# Patient Record
Sex: MALE | Race: BLACK OR AFRICAN AMERICAN | NOT HISPANIC OR LATINO | Employment: FULL TIME | URBAN - METROPOLITAN AREA
[De-identification: names, ages, dates, MRNs, and addresses within clinical notes are randomized per-mention and may not be internally consistent; named-entity substitution may affect disease eponyms.]

---

## 2017-09-20 DIAGNOSIS — M79.609 PAIN IN LIMB: Primary | ICD-10-CM

## 2017-09-22 ENCOUNTER — LAB VISIT (OUTPATIENT)
Dept: LAB | Facility: HOSPITAL | Age: 52
End: 2017-09-22
Attending: NURSE PRACTITIONER
Payer: MEDICAID

## 2017-09-22 DIAGNOSIS — Z00.01 ENCOUNTER FOR GENERAL ADULT MEDICAL EXAMINATION WITH ABNORMAL FINDINGS: ICD-10-CM

## 2017-09-22 DIAGNOSIS — Z12.5 SCREENING FOR PROSTATE CANCER: Primary | ICD-10-CM

## 2017-09-22 DIAGNOSIS — Z13.220 SCREENING FOR LIPID DISORDERS: ICD-10-CM

## 2017-09-22 LAB
ALBUMIN SERPL BCP-MCNC: 4.7 G/DL
ALP SERPL-CCNC: 55 U/L
ALT SERPL W/O P-5'-P-CCNC: 40 U/L
ANION GAP SERPL CALC-SCNC: 12 MMOL/L
AST SERPL-CCNC: 34 U/L
BASOPHILS # BLD AUTO: 0.03 K/UL
BASOPHILS NFR BLD: 0.5 %
BILIRUB SERPL-MCNC: 0.7 MG/DL
BUN SERPL-MCNC: 13 MG/DL
CALCIUM SERPL-MCNC: 9.7 MG/DL
CHLORIDE SERPL-SCNC: 105 MMOL/L
CO2 SERPL-SCNC: 28 MMOL/L
CREAT SERPL-MCNC: 0.93 MG/DL
DIFFERENTIAL METHOD: NORMAL
EOSINOPHIL # BLD AUTO: 0.1 K/UL
EOSINOPHIL NFR BLD: 2.3 %
ERYTHROCYTE [DISTWIDTH] IN BLOOD BY AUTOMATED COUNT: 13.1 %
EST. GFR  (AFRICAN AMERICAN): >60 ML/MIN/1.73 M^2
EST. GFR  (NON AFRICAN AMERICAN): >60 ML/MIN/1.73 M^2
GLUCOSE SERPL-MCNC: 77 MG/DL
HCT VFR BLD AUTO: 44.2 %
HGB BLD-MCNC: 14.7 G/DL
LYMPHOCYTES # BLD AUTO: 2.4 K/UL
LYMPHOCYTES NFR BLD: 39.6 %
MCH RBC QN AUTO: 28.4 PG
MCHC RBC AUTO-ENTMCNC: 33.3 G/DL
MCV RBC AUTO: 86 FL
MONOCYTES # BLD AUTO: 0.9 K/UL
MONOCYTES NFR BLD: 14.4 %
NEUTROPHILS # BLD AUTO: 2.6 K/UL
NEUTROPHILS NFR BLD: 43 %
PLATELET # BLD AUTO: 246 K/UL
PMV BLD AUTO: 11 FL
POTASSIUM SERPL-SCNC: 4 MMOL/L
PROT SERPL-MCNC: 7.5 G/DL
RBC # BLD AUTO: 5.17 M/UL
SODIUM SERPL-SCNC: 145 MMOL/L
TSH SERPL DL<=0.005 MIU/L-ACNC: 5.48 UIU/ML
WBC # BLD AUTO: 6.11 K/UL

## 2017-09-22 PROCEDURE — 36415 COLL VENOUS BLD VENIPUNCTURE: CPT | Mod: PO

## 2017-09-22 PROCEDURE — 80053 COMPREHEN METABOLIC PANEL: CPT | Mod: PO

## 2017-09-22 PROCEDURE — 84443 ASSAY THYROID STIM HORMONE: CPT | Mod: PO

## 2017-09-22 PROCEDURE — 84439 ASSAY OF FREE THYROXINE: CPT

## 2017-09-22 PROCEDURE — 85025 COMPLETE CBC W/AUTO DIFF WBC: CPT | Mod: PO

## 2017-09-22 PROCEDURE — 84153 ASSAY OF PSA TOTAL: CPT | Mod: PO

## 2017-09-22 PROCEDURE — 80061 LIPID PANEL: CPT

## 2017-09-23 LAB
CHOLEST SERPL-MCNC: 212 MG/DL
CHOLEST/HDLC SERPL: 3.7 {RATIO}
HDLC SERPL-MCNC: 57 MG/DL
HDLC SERPL: 26.9 %
LDLC SERPL CALC-MCNC: 138.2 MG/DL
NONHDLC SERPL-MCNC: 155 MG/DL
T4 FREE SERPL-MCNC: 0.97 NG/DL
TRIGL SERPL-MCNC: 84 MG/DL

## 2017-09-25 LAB
PROSTATE SPECIFIC ANTIGEN, TOTAL: 1.5 NG/ML
PSA FREE MFR SERPL: 12.67 %
PSA FREE SERPL-MCNC: 0.19 NG/ML

## 2017-10-02 ENCOUNTER — HOSPITAL ENCOUNTER (OUTPATIENT)
Dept: RADIOLOGY | Facility: HOSPITAL | Age: 52
Discharge: HOME | End: 2017-10-02
Attending: NURSE PRACTITIONER
Payer: MEDICAID

## 2017-10-02 DIAGNOSIS — M79.609 PAIN IN LIMB: ICD-10-CM

## 2017-10-02 PROCEDURE — 93925 LOWER EXTREMITY STUDY: CPT | Mod: TC,PO

## 2017-10-02 PROCEDURE — 93971 EXTREMITY STUDY: CPT | Mod: TC,PO

## 2017-12-12 ENCOUNTER — HOSPITAL ENCOUNTER (OUTPATIENT)
Dept: RADIOLOGY | Facility: HOSPITAL | Age: 52
Discharge: HOME | End: 2017-12-12
Attending: NURSE PRACTITIONER
Payer: MEDICAID

## 2017-12-12 DIAGNOSIS — M79.609 PAIN IN LIMB: Primary | ICD-10-CM

## 2017-12-12 DIAGNOSIS — M79.609 PAIN IN LIMB: ICD-10-CM

## 2017-12-12 PROCEDURE — 73552 X-RAY EXAM OF FEMUR 2/>: CPT | Mod: TC,PO,LT

## 2017-12-19 ENCOUNTER — CLINICAL SUPPORT (OUTPATIENT)
Dept: REHABILITATION | Facility: HOSPITAL | Age: 52
End: 2017-12-19
Payer: MEDICAID

## 2017-12-19 DIAGNOSIS — G89.29 CHRONIC BILATERAL LOW BACK PAIN WITH LEFT-SIDED SCIATICA: ICD-10-CM

## 2017-12-19 DIAGNOSIS — M54.42 CHRONIC BILATERAL LOW BACK PAIN WITH LEFT-SIDED SCIATICA: ICD-10-CM

## 2017-12-19 DIAGNOSIS — R29.898 WEAKNESS OF BOTH LOWER EXTREMITIES: ICD-10-CM

## 2017-12-19 PROBLEM — M54.50 CHRONIC BILATERAL LOW BACK PAIN: Status: ACTIVE | Noted: 2017-12-19

## 2017-12-19 PROCEDURE — 97161 PT EVAL LOW COMPLEX 20 MIN: CPT | Mod: PO | Performed by: PHYSICAL MEDICINE & REHABILITATION

## 2017-12-19 NOTE — PATIENT INSTRUCTIONS
Supine        Lie on back with one knee bent, foot flat on floor. Hook strap around ball of other foot. Pull toes and forefoot toward knee, extend heel. Relax foot ONLY (knee remains straight). Hold __10_ seconds.  Repeat _5__ times per session. Do __2_ sessions per day.    Lumbar Rotation    Feet on floor, slowly rock knees from side to side in small, pain-free range of motion. Allow lower back to rotate slightly, but keep shoulders flat on ground.  Repeat 10 times per set. Do 1 sets per session. Do 2 sessions per day.        Copyright © I. All rights reserved.           Piriformis Stretch  Lie on your back with knees bent. Place one foot on top of the other knee. Use opposite hand to pull that knee toward your shoulder. Perform 5 times holding for count of 10. Perform 2 times per day.        Hip Flexor Stretch  Lie on your back with knees bent. Let one leg hang off of the bed and bend that knee back to feel a stretch in your thigh. Perform 5 times holding for count of ten 2 times per day.    Copyright © 9219-1276 [employer]

## 2017-12-19 NOTE — PLAN OF CARE
"Name:Matthew Romero  Physician:Trinidad Ahmadi NP  Date of eval:12/19/2017  Orders:  Physical Therapy evaluate and treat  Clinic: [id number]  Diagnosis:  1. Chronic bilateral low back pain with left-sided sciatica     2. Weakness of both lower extremities         Precautions: None  Evaluation date: 12/19/2017  Visit # authorized: 1/1  Authorization period: [id number]  Plan of care expiration: 26467    Start time: 5:10 PM  Stop Time: 6:0 PM    Timed     Procedure  Min     Units                          Untimed     Procedure  Min  Units   IE 50 1                 Subjective     Chief complaint: Patient states has had pain in his low back and tingling and numbness in left lower extremity--points to left anterior thigh and calf and dorsum of left foot. for about 6 years. Denies injury. States he did a lot of jogging when in group home.   Onset: 6 years   Mechanism of onset :  gradual    Aggravating factors: Prolonged sitting > 15 mins, prolonged standing and walking > 15 mins  Easing factors: nothing  Sleep is  not disturbed. 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      MRI: None on file           Xray: none on file    Pain level with 0 being the lowest and 10 being the highest presently: 0/10  Pain level with 0 being the lowest and 10 being the highest at worst: 7/10  Pain level with 0 being the lowest and 10 being the highest at best: 0/10     Patient Goals: "I want to get better"    Objective     Postural examination in standing and sitting:  -(+)  forward head  - (+) forward shoulders  - " (-)  hip high  -(-)  shoulder high  - (+) decreased lumbar lordosis  - (-) Thoracic kyphosis        Functional assessment:   - walking/gait:Ambulates independent of AD with normal gait pattern  - sit to stand: Independent  - sit to supine: Independent       - supine to sit: Independent  - supine to prone: Independent     Flexibility testing:  - hamstrings:     90/90 test R -40 L -40           - gastrocnemius:   DF ankle R 10 degrees L 10 degrees  - piriformis: (+) (B)  tightness               - quadriceps: (+) (B)     tightness           - hip flexors: (+) (b) tightness  - hip adductors: (-)   - IT Bands: (-)     Lumbar ROM: (measured in degrees)    Degrees Quality   Flexion 60   No c/o pain   Extension 10    C/o pain in mid low back thru ROM   Left Side Bending 20 With c/o pain low back at end range   Right Side Bending 20  With c/o pain low back at end range         Dermatomes: (impaired/normal)     RLE LLE   L2 Intact Intact   L3 Intact Intact   L4 Intact Intact   L5 Intact Intact   S1 Intact Intact     Muscle Strength  MMT R L   Hip flexion 4/5 4/5   Hip abduction 4/5 4/5   Hip extension 4/5 4/5   Knee extension 5/5 5/5   Knee flexion 5/5 5/5   Ankle dorsiflexion 5/5 5/5   Ankle plantar flexion 5/5 5/5   Ankle inversion 5/5 5/5   Ankle eversion 5/5 5/5     Reflexes: 2+ BLE DTR    Special Tests: ((+): pos.; (-): neg.)   · SLR Test: (-)         SKC Test: (-)   · Hip Screen:(-) PADILLA for low back pain  · Repetitive Motion: (-) flexion and extension  for pain  In low back  · Prone Instability: NT  · Saddle Sensation: (-)     Palpation for condition: No c/o pain on palpation of lumbar spine      Joint Mobility:  Hypomobility lumbar vertebra     Endurance is Good    PT Evaluation Completed? Yes  Discussed Plan of Care with patient: Yes    Outcome measures:    Impairment function:  Mobility  FOTO score: 47/100      Patient seen for initial evaluation per insurance authorization and given HEP to follow.    Date 12/19/18  "  Visit IE   FOTO  1/5   POC Exp Date 1/30/18   Face to Face        LTR 1 x10   Supine HSS (B) 5 x 10"   Supine Piriformis (s) (B) 5 x 10"   Supine Hip Flexor (s) (B) 5 x 10"                       Initials VK         Pt. Education:   -Instructed in use of lumbar roll with sitting to improve his posture  -Instructed pt. regarding:proper technique with all exercises. Pt. to demonstrate good understanding of the education provided. Matthew demonstrated good return demonstration of activities. No cultural, environmental, or spiritual barriers identified to treatment or learning.    Assessment   This is a 52 y.o. male referred to outpatient physical therapy and presents with a medical diagnosis of left leg pain and PT diagnosis/findings of low back and left low pain, decreased AROM, dtrength, and flexibility, poor posture demonstrating limitations as described in the problem list. Patient was in agreement with set goals and plan of care. Pt was given a written HEP along with posture education, instruction on body mechanics, activity modification/avoidance, and low back and LE stretching regimen. Pt. verbally understood instructions and demonstrated proper form/technique. Pt was advised to perform these exercises free of pain, and discontinue use if symptoms persist/worsen. Pt will benefit from physical therapy services in order to maximize pain free functional independence. Rehab potential is good    History  Co-morbidities and personal factors that may impact the plan of care Examination  Body Structures and Functions, activity limitations and participation restrictions that may impact the plan of care Clinical Presentation   Decision Making/ Complexity Score       Co-morbidities:   None      Personal Factors:   None     Body Regions:Lumbar spine    Body Systems: Musculoskeletal: AROM, strength, flexibility, poor posture          Activity limitations: Mobility       Participation Restrictions: General tasks and " demands             Stable and uncomplicated       Low    FOTO Score:47/100         Medical necessity is demonstrated by the following IMPAIRMENTS/PROBLEM LIST:  Decreased range of motion lumbar spine  Decreased strength bilateral hips and core  Increased pain with walking  Increased pain with prolonged sitting and standing  Increased pain with sit to stand transfer  Increased pain and limited with climbing stairs  Poor Posture  ADL and household activities lead to increased pain and are limited  Functional impairment rating of: 50%    GOALS:   Short Term Goals:  3 weeks  Increase range of motion 25%  Increase strength 1/2 muscle grade  Patient will demonstrate good posture and body mechanics wit ADL's and work activities  Be able to perform HEP with minimal cueing required  Improve functional impairment of mobility to 30%    Long Term Goals: 6 weeks  Increase range of motion to 75% to 100% full   Improve muscle strength 1 muscle grade  Improve muscle strength with MMT to 4+/5 to 5/5  Restore ability to ambulate with normal pain free gait  Walking for ADL and exercise will be restored without increased pain  Restore ability to sit and stand for ADL without increased pain  Restore ability to perform sit to stand transfer without increased pain  Restore ability to climb stairs in a reciprocal manner with min to 0 increased pain and/or difficulty  Restore ability to perform ADL's and household activities independently and without increased pain  Improve functional impairment of mobility to 10 % or less    Plan     Pt will be treated by physical therapy 2 times a week for 6 weeks to include: Therapeutic exercises to increase ROM, strength and stabilization; joint and soft tissue mobilization with manual therapy techniques to decrease muscle tightness, pain and improve joint mobility; neuromuscular re-education to improve balance, coordination, kinesthetic sense and proprioception, therapeutic activities to improve  coordination, strength and function, therapeutic taping to decrease pain, provide support and improve function of the LE(s); modalities such as moist heat, ice, ultrasound and electrical stimulation to increase circulation, decrease pain and inflammation; dry needling with manual therapy techniques to decrease pain, inflammation and swelling, increase circulation and promote healing process will be considered and utilized as needed; aquatic physical therapy will be utilized as needed.  Pt may be seen by PTA to carry out plan of care as part of the Rehab team.    I certify the need for these services furnished under this plan of treatment and while under my care.    ____________________________________ Physician/Referring Practitioner                                  Date of Signature          Leeann Ness PT

## 2018-01-30 ENCOUNTER — DOCUMENTATION ONLY (OUTPATIENT)
Dept: REHABILITATION | Facility: HOSPITAL | Age: 53
End: 2018-01-30

## 2018-01-30 DIAGNOSIS — M54.42 CHRONIC BILATERAL LOW BACK PAIN WITH BILATERAL SCIATICA: ICD-10-CM

## 2018-01-30 DIAGNOSIS — G89.29 CHRONIC BILATERAL LOW BACK PAIN WITH BILATERAL SCIATICA: ICD-10-CM

## 2018-01-30 DIAGNOSIS — M54.41 CHRONIC BILATERAL LOW BACK PAIN WITH BILATERAL SCIATICA: ICD-10-CM

## 2018-01-30 DIAGNOSIS — R29.898 WEAKNESS OF BOTH LOWER EXTREMITIES: ICD-10-CM

## 2018-01-30 PROBLEM — M54.50 CHRONIC BILATERAL LOW BACK PAIN: Status: RESOLVED | Noted: 2017-12-19 | Resolved: 2018-01-30

## 2018-01-30 NOTE — PROGRESS NOTES
Mr. Romero was seen in outpatient physical therapy for an initial evaluation on 12/19/17 for chronic low back pain. Mr. Romero attended no follow up visits and has self discharged as he/she has not returned for further physical therapy since initial evaluation. POC has ended and patient is discharged from physical therapy.        Leeann Ness, PT

## 2019-01-23 ENCOUNTER — LAB VISIT (OUTPATIENT)
Dept: LAB | Facility: HOSPITAL | Age: 54
End: 2019-01-23
Attending: NURSE PRACTITIONER
Payer: MEDICAID

## 2019-01-23 DIAGNOSIS — Z12.5 ENCOUNTER FOR SCREENING FOR MALIGNANT NEOPLASM OF PROSTATE: Primary | ICD-10-CM

## 2019-01-23 LAB
ALBUMIN SERPL BCP-MCNC: 4.4 G/DL
ALP SERPL-CCNC: 56 U/L
ALT SERPL W/O P-5'-P-CCNC: 26 U/L
ANION GAP SERPL CALC-SCNC: 8 MMOL/L
AST SERPL-CCNC: 33 U/L
BASOPHILS # BLD AUTO: 0.01 K/UL
BASOPHILS NFR BLD: 0.3 %
BILIRUB SERPL-MCNC: 0.5 MG/DL
BUN SERPL-MCNC: 19 MG/DL
CALCIUM SERPL-MCNC: 9.4 MG/DL
CHLORIDE SERPL-SCNC: 102 MMOL/L
CHOLEST SERPL-MCNC: 180 MG/DL
CHOLEST/HDLC SERPL: 3.7 {RATIO}
CO2 SERPL-SCNC: 29 MMOL/L
CREAT SERPL-MCNC: 0.97 MG/DL
DIFFERENTIAL METHOD: ABNORMAL
EOSINOPHIL # BLD AUTO: 0.2 K/UL
EOSINOPHIL NFR BLD: 4 %
ERYTHROCYTE [DISTWIDTH] IN BLOOD BY AUTOMATED COUNT: 13 %
EST. GFR  (AFRICAN AMERICAN): >60 ML/MIN/1.73 M^2
EST. GFR  (NON AFRICAN AMERICAN): >60 ML/MIN/1.73 M^2
GLUCOSE SERPL-MCNC: 90 MG/DL
HCT VFR BLD AUTO: 44.9 %
HDLC SERPL-MCNC: 49 MG/DL
HDLC SERPL: 27.2 %
HGB BLD-MCNC: 14.7 G/DL
LDLC SERPL CALC-MCNC: 118 MG/DL
LYMPHOCYTES # BLD AUTO: 1.8 K/UL
LYMPHOCYTES NFR BLD: 46.6 %
MCH RBC QN AUTO: 28.4 PG
MCHC RBC AUTO-ENTMCNC: 32.7 G/DL
MCV RBC AUTO: 87 FL
MONOCYTES # BLD AUTO: 0.5 K/UL
MONOCYTES NFR BLD: 13.8 %
NEUTROPHILS # BLD AUTO: 1.3 K/UL
NEUTROPHILS NFR BLD: 35 %
NONHDLC SERPL-MCNC: 131 MG/DL
PLATELET # BLD AUTO: 281 K/UL
PMV BLD AUTO: 9.8 FL
POTASSIUM SERPL-SCNC: 4.5 MMOL/L
PROSTATE SPECIFIC ANTIGEN, TOTAL: 0.99 NG/ML
PROT SERPL-MCNC: 7.4 G/DL
PSA FREE MFR SERPL: 16.16 %
PSA FREE SERPL-MCNC: 0.16 NG/ML
RBC # BLD AUTO: 5.18 M/UL
SODIUM SERPL-SCNC: 139 MMOL/L
T4 FREE SERPL-MCNC: 1.1 NG/DL
TRIGL SERPL-MCNC: 65 MG/DL
TSH SERPL DL<=0.005 MIU/L-ACNC: 4.22 UIU/ML
WBC # BLD AUTO: 3.78 K/UL

## 2019-01-23 PROCEDURE — 80061 LIPID PANEL: CPT

## 2019-01-23 PROCEDURE — 85025 COMPLETE CBC W/AUTO DIFF WBC: CPT | Mod: PO

## 2019-01-23 PROCEDURE — 84443 ASSAY THYROID STIM HORMONE: CPT | Mod: PO

## 2019-01-23 PROCEDURE — 84439 ASSAY OF FREE THYROXINE: CPT

## 2019-01-23 PROCEDURE — 84154 ASSAY OF PSA FREE: CPT | Mod: PO

## 2019-01-23 PROCEDURE — 80053 COMPREHEN METABOLIC PANEL: CPT | Mod: PO

## 2019-01-23 PROCEDURE — 36415 COLL VENOUS BLD VENIPUNCTURE: CPT | Mod: PO

## 2021-06-09 ENCOUNTER — HOSPITAL ENCOUNTER (OUTPATIENT)
Dept: RADIOLOGY | Facility: HOSPITAL | Age: 56
Discharge: HOME | End: 2021-06-09
Attending: NURSE PRACTITIONER
Payer: MEDICAID

## 2021-06-09 DIAGNOSIS — M54.50 LOW BACK PAIN: ICD-10-CM

## 2021-06-09 PROCEDURE — 72100 X-RAY EXAM L-S SPINE 2/3 VWS: CPT | Mod: TC,FY,PO

## 2022-12-22 DIAGNOSIS — M25.512 LEFT SHOULDER PAIN, UNSPECIFIED CHRONICITY: Primary | ICD-10-CM

## 2022-12-22 NOTE — PROGRESS NOTES
"Subjective:      Patient ID: Matthew Romero is a 57 y.o. male.    Chief Complaint: Pain of the Left Shoulder (Patient presents today as a new patient complaining he has pain in his left shoulder. Patient states he has been having pain for years off an on with the line of work he does a lot of lifting. )      HPI  ([place])    History of chronic constant left shoulder pain x years that has gotten worse. 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     He has constant pain in left shoulder that is worse with laying on left side and also with overhead motions. He is right hand dominant. No specific injury to shoulder that he remembers. He rates his pain as an 8 on a scale of 1-10. Pain is aching in nature. He has some numbness/tingling in his left arm.     PCP had him on voltaren and robaxin with minimal relief. He did a little PT about 2 years ago, but could not do because of his work schedule. No injections or surgery on his shoulder.       History reviewed. No pertinent past medical history.      Current Outpatient Medications:     amLODIPine (NORVASC) 10 MG tablet, Take 10 mg by mouth., Disp: , Rfl:     lisinopriL (PRINIVIL,ZESTRIL) 40 MG tablet, Take 40 mg by mouth., Disp: , Rfl:     methocarbamoL (ROBAXIN) 750 MG Tab, Take 750 mg by mouth 3 (three) times daily., Disp: , Rfl:     meloxicam (MOBIC) 15 MG tablet, Take 1 tablet (15 mg total) by mouth once daily. Take with food., Disp: 30 tablet, Rfl: 2    Review of patient's allergies indicates:  No Known Allergies    Review of Systems   Constitutional: Negative for chills, fever, night sweats and weight gain.   Gastrointestinal:  Negative for bowel incontinence, nausea and vomiting.   Genitourinary:  Negative for bladder incontinence.   Neurological:  Negative for disturbances in coordination and loss of balance.         Objective:        Ht 5' 5" (1.651 m)   Wt 102.2 kg (225 lb 3.2 oz)   BMI 37.48 kg/m² "     General    Vitals reviewed.  Constitutional: He is oriented to person, place, and time. He appears well-developed and well-nourished.   Pulmonary/Chest: Effort normal.   Abdominal: He exhibits no distension.   Neurological: He is alert and oriented to person, place, and time.   Psychiatric: He has a normal mood and affect. His behavior is normal. Judgment and thought content normal.         ROM OF BOTH SHOULDERS:  Active flexion to 160° on left and 180° on right.   Active abduction to 160° on left and 180° on right.         Strength Testing of both UEs:  Deltoid - 5/5 on left and 5/5 on right  Biceps - 5/5 on left and 5/5 on right  Triceps - 5/5 on left and 5/5 on right  Wrist extension - 5/5 on left and 5/5 on right  Wrist flexion - 5/5 on left and 5/5 on right   - 5/5 on left and 5/5 on right    RIGHT SHOULDER EXAM:  Tenderness:  No tenderness at the SC or AC joint  No tenderness over the clavicle   No tenderness over biceps tendon or bicipital groove  No tenderness over subacromial space    Special Tests:  Empty can test - negative  Full can test - negative  Resisted internal rotation - negative  Resisted external rotation - negative    Neer's test - negative  Hawkin's-Grover test - negative    Speed's test - negative  Yergason's test - negative    Sulcus sign - none  AP load and shift laxity - none    LEFT SHOULDER EXAM:  Tenderness:  No tenderness at the SC or AC joint  No tenderness over the clavicle   No tenderness over biceps tendon or bicipital groove  Mild tenderness over subacromial space    Mild trapezial tenderness on left.     Special Tests:  Empty can test - positive for pain only  Full can test - positive for pain only  Resisted internal rotation - negative  Resisted external rotation - negative    Neer's test - positive  Hawkin's-Grover test - positive    Speed's test - negative  Yergason's test - negative    Sulcus sign - none  AP load and shift laxity - none    Neurovascular Exam  Bilateral UEs:  Sensation intact to light touch in the distal median, radial, and ulnar nerve distributions bilaterally.  Capillary refill intact <2 seconds in all digits bilaterally      XRAY INTERPRETATION:   X-rays of left shoulder dated 1/4/23 are personally reviewed and show no fracture or dislocation.         Assessment:       Encounter Diagnoses   Name Primary?    Chronic left shoulder pain     Impingement syndrome of left shoulder Yes          Plan:       Matthew was seen today for pain.    Diagnoses and all orders for this visit:    Impingement syndrome of left shoulder  -     meloxicam (MOBIC) 15 MG tablet; Take 1 tablet (15 mg total) by mouth once daily. Take with food.  -     Ambulatory referral/consult to Physical/Occupational Therapy; Future    Chronic left shoulder pain  -     Ambulatory referral/consult to Orthopedics  -     meloxicam (MOBIC) 15 MG tablet; Take 1 tablet (15 mg total) by mouth once daily. Take with food.  -     Ambulatory referral/consult to Physical/Occupational Therapy; Future      History of chronic constant left shoulder pain x years that has gotten worse. 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     He has constant pain in left shoulder that is worse with laying on left side and also with overhead motions. He is right hand dominant.     Left shoulder XRs look okay. Pain likely due to bursitis and/or impingement.     Treatment options reviewed with patient along with above left shoulder xrays. Following plan made:     - PT orders for left shoulder sent to [facility].   - New prescription for mobic. Reviewed dosing and side effects. Take with food. Stop voltaren.   - Discussed left shoulder injection. He wanted to hold off.   - Given work note for light duty (no overhead lifting and no lifting over 25 pounds) until his f/u with me.   - If no improvement with above, consider revisiting left shoulder injection and/or  left shoulder MRI.     Follow up in about 3 months (around 4/4/2023).

## 2023-01-04 ENCOUNTER — OFFICE VISIT (OUTPATIENT)
Dept: ORTHOPEDICS | Facility: CLINIC | Age: 58
End: 2023-01-04
Payer: MEDICAID

## 2023-01-04 VITALS — HEIGHT: 65 IN | BODY MASS INDEX: 37.52 KG/M2 | WEIGHT: 225.19 LBS

## 2023-01-04 DIAGNOSIS — M75.42 IMPINGEMENT SYNDROME OF LEFT SHOULDER: Primary | ICD-10-CM

## 2023-01-04 DIAGNOSIS — G89.29 CHRONIC LEFT SHOULDER PAIN: ICD-10-CM

## 2023-01-04 DIAGNOSIS — M25.512 CHRONIC LEFT SHOULDER PAIN: ICD-10-CM

## 2023-01-04 PROCEDURE — 1159F MED LIST DOCD IN RCRD: CPT | Mod: CPTII,,, | Performed by: PHYSICIAN ASSISTANT

## 2023-01-04 PROCEDURE — 1159F PR MEDICATION LIST DOCUMENTED IN MEDICAL RECORD: ICD-10-PCS | Mod: CPTII,,, | Performed by: PHYSICIAN ASSISTANT

## 2023-01-04 PROCEDURE — 3008F BODY MASS INDEX DOCD: CPT | Mod: CPTII,,, | Performed by: PHYSICIAN ASSISTANT

## 2023-01-04 PROCEDURE — 99999 PR PBB SHADOW E&M-EST. PATIENT-LVL IV: ICD-10-PCS | Mod: PBBFAC,,, | Performed by: PHYSICIAN ASSISTANT

## 2023-01-04 PROCEDURE — 99999 PR PBB SHADOW E&M-EST. PATIENT-LVL IV: CPT | Mod: PBBFAC,,, | Performed by: PHYSICIAN ASSISTANT

## 2023-01-04 PROCEDURE — 99203 OFFICE O/P NEW LOW 30 MIN: CPT | Mod: S$PBB,,, | Performed by: PHYSICIAN ASSISTANT

## 2023-01-04 PROCEDURE — 99203 PR OFFICE/OUTPT VISIT, NEW, LEVL III, 30-44 MIN: ICD-10-PCS | Mod: S$PBB,,, | Performed by: PHYSICIAN ASSISTANT

## 2023-01-04 PROCEDURE — 99214 OFFICE O/P EST MOD 30 MIN: CPT | Mod: PBBFAC,PN | Performed by: PHYSICIAN ASSISTANT

## 2023-01-04 PROCEDURE — 3008F PR BODY MASS INDEX (BMI) DOCUMENTED: ICD-10-PCS | Mod: CPTII,,, | Performed by: PHYSICIAN ASSISTANT

## 2023-01-04 RX ORDER — AMLODIPINE BESYLATE 10 MG/1
10 TABLET ORAL
COMMUNITY
Start: 2022-12-31

## 2023-01-04 RX ORDER — DICLOFENAC SODIUM 75 MG/1
75 TABLET, DELAYED RELEASE ORAL 2 TIMES DAILY
COMMUNITY
Start: 2022-11-21 | End: 2023-01-04

## 2023-01-04 RX ORDER — MELOXICAM 15 MG/1
15 TABLET ORAL DAILY
Qty: 30 TABLET | Refills: 2 | Status: SHIPPED | OUTPATIENT
Start: 2023-01-04

## 2023-01-04 RX ORDER — LISINOPRIL 40 MG/1
40 TABLET ORAL
COMMUNITY
Start: 2022-12-31

## 2023-01-04 RX ORDER — METHOCARBAMOL 750 MG/1
750 TABLET, FILM COATED ORAL 3 TIMES DAILY
COMMUNITY
Start: 2022-11-21

## 2023-01-04 NOTE — PATIENT INSTRUCTIONS
It was nice to meet you today! I am sorry that you are hurting so much.     Your left shoulder xrays look good. Pain may be from inflammation/bursitis.     I sent meloxicam to your pharmacy to help with pain/inflammation. Take as directed with food. Stop the diclofenac.     I sent physical therapy orders to [facility]. They should call you to set up, but if not you can call [phone].     If no improvement with above, we can consider a shoulder injection and/or MRI.     I will see you back in 3 months, but please stay in touch and call me if you need anything. You can also send me a message in [facility].     Sarina   [phone]

## 2023-02-09 ENCOUNTER — CLINICAL SUPPORT (OUTPATIENT)
Dept: REHABILITATION | Facility: HOSPITAL | Age: 58
End: 2023-02-09
Payer: MEDICAID

## 2023-02-09 DIAGNOSIS — M75.42 IMPINGEMENT SYNDROME OF LEFT SHOULDER: ICD-10-CM

## 2023-02-09 DIAGNOSIS — R29.898 SHOULDER WEAKNESS: ICD-10-CM

## 2023-02-09 DIAGNOSIS — M25.512 CHRONIC LEFT SHOULDER PAIN: ICD-10-CM

## 2023-02-09 DIAGNOSIS — M25.612 IMPAIRED RANGE OF MOTION OF BOTH SHOULDERS: ICD-10-CM

## 2023-02-09 DIAGNOSIS — M25.611 IMPAIRED RANGE OF MOTION OF BOTH SHOULDERS: ICD-10-CM

## 2023-02-09 DIAGNOSIS — G89.29 CHRONIC LEFT SHOULDER PAIN: ICD-10-CM

## 2023-02-09 PROCEDURE — 97161 PT EVAL LOW COMPLEX 20 MIN: CPT | Mod: PO

## 2023-02-09 PROCEDURE — 97110 THERAPEUTIC EXERCISES: CPT | Mod: PO

## 2023-02-10 PROBLEM — M25.612 IMPAIRED RANGE OF MOTION OF BOTH SHOULDERS: Status: ACTIVE | Noted: 2023-02-10

## 2023-02-10 PROBLEM — R29.898 SHOULDER WEAKNESS: Status: ACTIVE | Noted: 2023-02-10

## 2023-02-10 PROBLEM — M25.611 IMPAIRED RANGE OF MOTION OF BOTH SHOULDERS: Status: ACTIVE | Noted: 2023-02-10

## 2023-02-10 NOTE — PLAN OF CARE
[facility]   Physical Therapy Initial Evaluation     Date: 2/9/2023   Name: Matthew Romero  [facility] Number: [id number]    Therapy Diagnosis:   Encounter Diagnoses   Name Primary?    Chronic left shoulder pain     Impingement syndrome of left shoulder     Shoulder weakness     Impaired range of motion of both shoulders      Physician: Sarina Kohler, MICK    Physician Orders: PT Eval and Treat   Medical Diagnosis from Referral:   M25.512,G89.29 (ICD-10-CM) - Chronic left shoulder pain   M75.42 (ICD-10-CM) - Impingement syndrome of left shoulder     Evaluation Date: 2/9/2023  Authorization Period Expiration: 01/04/2024  Plan of Care Expiration: 3/23/2023  Progress Note Due: 3/9/2023  Visit # / Visits authorized: 1/ 1   FOTO: 0/5    Precautions: Standard     Time In: 4:12 pm  Time Out: 5:00 pm  Total Appointment Time (timed & untimed codes): 48 minutes      SUBJECTIVE     Date of onset: 6 months prior to evaluation    History of current condition - Matthew reports: I hurt my lower back years ago initially, and now I have problems with my L shoulder. 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 I was supposed to get worker's comp, but I had a previous history of issues with my shoulder. They could not prove that it had been re-aggravated from work on the job, so it is not going to be considered for worker's comp for now.    Falls: None reported    Imaging,     X-ray L shoulder:  FINDINGS:  There is no fracture, dislocation, or bony erosion.    Prior Therapy: Yes, for the low back and it did not help  Social History: lives with their spouse  Occupation:  for a liquor distributor  Prior Level of Function: Independent  Current  Level of Function: Independent (has pain with work activities lifting boxes >50lbs on a regular basis)    Pain:  Current 6/10, worst 10/10, best 5/10   Location: L shoulder  Description: Aching, Tingling, and Numb  Aggravating Factors: lifting heavy weight >50lbs overhead and sometimes nothing causes pain levels to increase  Easing Factors: nothing    Patients goals: feel better, less pain in shoulder, be able to do my job     Medical History:   No past medical history on file.    Surgical History:   Matthew Romero  has no past surgical history on file.    Medications:   Matthew has a current medication list which includes the following prescription(s): amlodipine, lisinopril, meloxicam, and methocarbamol.    Allergies:   Review of patient's allergies indicates:  No Known Allergies       OBJECTIVE     Observation: Pt is a 56 yo M presenting to therapy in no apparent distress with complaints of L shoulder pain.    Posture: B shoulders rolled forward inc thoracic kyphosis    Passive Range of Motion:   Shoulder Left Right   Flexion 170 164   Abduction 154* 154   ER at 0 NT NT   ER at 90 NT NT   IR NT NT      Active Range of Motion:   Shoulder Left Right   Flexion 165* 157   Abduction 150* 145   ER at 0 NT NT   ER at 90 Occiput T3   IR T12 T11     Upper Extremity Strength   (L) UE (R) UE   Shoulder flexion: 4-/5 4/5   Shoulder Abduction: 4-/5 4+/5   Shoulder ER 4-/5 5/5   Shoulder IR 4/5 5/5   Lower Trap NT NT   Middle Trap NT NT   Rhomboids NT NT         Special Tests:  AC Joint Left Right   AC Joint Compression Test Pos Neg   Empty Can Test Pos Neg   Drop Arm test Neg Neg   Subscaputlaris Lift Off Unable Unable   Neer's Test Pos Neg       Palpation: TTP with moderate pressure to L RTC musculature      Scapular Control/Dyskinesis:    Normal / Subtle / Obvious  Comments    Left  normal N/A    Right  normal N/A          Limitation/Restriction for FOTO Shoulder Survey    Therapist reviewed FOTO scores for Matthew Romero on  2/9/2023.   FOTO documents entered into [employer] - see Media section.    Limitation Score: 27%         TREATMENT     Total Treatment time (time-based codes) separate from Evaluation: 10 minutes      Matthew received the treatments listed below:      therapeutic exercises to develop strength, endurance, ROM, posture, and core stabilization for 10 minutes including:  - UBE  - Butterflies  - Supine Snow Quinter  - Chin tucks  - Scap squeezes  - Posterior capsule str  - UT str  - LS str  - TB rows  - TB lat pulldowns  - TB ER  - TB IR  - Pallof press  - Body blade      therapeutic activities to improve functional performance for 3  minutes, including: *billed as therex  - lumbar roll for increased low back support while seated for job as a drived      PATIENT EDUCATION AND HOME EXERCISES     Education provided:   - importance of consistent performance of HEP  - role of PT/PTA    Written Home Exercises Provided: yes. Exercises were reviewed and Matthew was able to demonstrate them prior to the end of the session.  Matthew demonstrated good  understanding of the education provided. See EMR under Patient Instructions for exercises provided during therapy sessions.    ASSESSMENT     Matthew is a 57 y.o. male referred to outpatient Physical Therapy with a medical diagnosis of chronic L shoulder pain and impingement of L shoulder. Patient presents with moderate pain in his L shoulder. He displays decreased L shoulder ER ROM as well as strength deficits of the LUE. Additionally, he displays signs and symptoms consistent with subacromial impingement of the LUE.    Patient prognosis is Fair.   Patient will benefit from skilled outpatient Physical Therapy to address the deficits stated above and in the chart below, provide patient /family education, and to maximize patient's level of independence.     Plan of care discussed with patient: Yes  Patient's spiritual, cultural and educational needs considered and patient is agreeable to the plan  of care and goals as stated below:     Anticipated Barriers for therapy: None    Medical Necessity is demonstrated by the following  History  Co-morbidities and personal factors that may impact the plan of care Co-morbidities:   None    Personal Factors:   no deficits     low   Examination  Body Structures and Functions, activity limitations and participation restrictions that may impact the plan of care Body Regions:   back  upper extremities    Body Systems:    ROM  strength    Participation Restrictions:   None    Activity limitations:   Learning and applying knowledge  no deficits    General Tasks and Commands  no deficits    Communication  no deficits    Mobility  lifting and carrying objects  moving around using equipment (WC)  using transportation (bus, train, plane, car)  driving (bike, car, motorcycle)    Self care  no deficits    Domestic Life  shopping  cooking  doing house work (cleaning house, washing dishes, laundry)    Interactions/Relationships  no deficits    Life Areas  no deficits    Community and Social Life  community life  recreation and leisure         low   Clinical Presentation stable and uncomplicated low   Decision Making/ Complexity Score: low     Goals:   Short Term Goals: 4 weeks   1. This patient will be independent with a basic HEP. In progress, not met  2. This patient will increase L UE strength to 4/5 in order to be able to lift < or = 30lbs to perform job responsibilities no difficulty. In progress, not met  3. This patient will have a pain rating of 5/10 at worst with ADLs. In progress, not met     Long Term Goals 8 weeks   1. This patient will be independent with an updated HEP. In progress, not met  2. This patient will increase L UE strength to 5/5 in order to be able to lift < or = 50lbs to perform job responsibilities. In progress, not met  3. This patient will have a pain rating of 3/10 at worst with ADLs. In progress, not met   4. This patient will have a FOTO score of 60%  with 40% impairment with functional activities. In progress, not met    PLAN   Plan of care Certification: 2/9/2023 to 3/23/2023.    Outpatient Physical Therapy 2 times weekly for 6 weeks to include the following interventions: Manual Therapy, Moist Heat/ Ice, Neuromuscular Re-ed, Patient Education, Therapeutic Activities, and Therapeutic Exercise.     Katina Tapai, PT      I CERTIFY THE NEED FOR THESE SERVICES FURNISHED UNDER THIS PLAN OF TREATMENT AND WHILE UNDER MY CARE   Physician's comments:     Physician's Signature: ___________________________________________________

## 2023-05-09 ENCOUNTER — OFFICE VISIT (OUTPATIENT)
Dept: URGENT CARE | Facility: CLINIC | Age: 58
End: 2023-05-09
Payer: OTHER MISCELLANEOUS

## 2023-05-09 DIAGNOSIS — S00.83XA CONTUSION OF FOREHEAD, INITIAL ENCOUNTER: Primary | ICD-10-CM

## 2023-05-09 DIAGNOSIS — G44.319 ACUTE POST-TRAUMATIC HEADACHE, NOT INTRACTABLE: ICD-10-CM

## 2023-05-09 DIAGNOSIS — S00.83XA FACIAL CONTUSION, INITIAL ENCOUNTER: ICD-10-CM

## 2023-05-09 LAB — BREATH ALCOHOL: NEGATIVE

## 2023-05-09 PROCEDURE — 82075 ASSAY OF BREATH ETHANOL: CPT | Mod: S$GLB,,, | Performed by: EMERGENCY MEDICINE

## 2023-05-09 PROCEDURE — 70150 X-RAY EXAM OF FACIAL BONES: CPT | Mod: S$GLB,,, | Performed by: RADIOLOGY

## 2023-05-09 PROCEDURE — 99203 PR OFFICE/OUTPT VISIT, NEW, LEVL III, 30-44 MIN: ICD-10-PCS | Mod: 25,S$GLB,, | Performed by: EMERGENCY MEDICINE

## 2023-05-09 PROCEDURE — 96372 PR INJECTION,THERAP/PROPH/DIAG2ST, IM OR SUBCUT: ICD-10-PCS | Mod: S$GLB,,, | Performed by: EMERGENCY MEDICINE

## 2023-05-09 PROCEDURE — 99203 OFFICE O/P NEW LOW 30 MIN: CPT | Mod: 25,S$GLB,, | Performed by: EMERGENCY MEDICINE

## 2023-05-09 PROCEDURE — 70150 XR FACIAL BONES 3 OR MORE VIEW: ICD-10-PCS | Mod: S$GLB,,, | Performed by: RADIOLOGY

## 2023-05-09 PROCEDURE — 80305 DRUG TEST PRSMV DIR OPT OBS: CPT | Mod: QW,S$GLB,, | Performed by: EMERGENCY MEDICINE

## 2023-05-09 PROCEDURE — 82075 POCT BREATH ALCOHOL TEST: ICD-10-PCS | Mod: S$GLB,,, | Performed by: EMERGENCY MEDICINE

## 2023-05-09 PROCEDURE — 96372 THER/PROPH/DIAG INJ SC/IM: CPT | Mod: S$GLB,,, | Performed by: EMERGENCY MEDICINE

## 2023-05-09 PROCEDURE — 80305 OOH NON-DOT DRUG SCREEN: ICD-10-PCS | Mod: QW,S$GLB,, | Performed by: EMERGENCY MEDICINE

## 2023-05-09 RX ORDER — KETOROLAC TROMETHAMINE 30 MG/ML
30 INJECTION, SOLUTION INTRAMUSCULAR; INTRAVENOUS
Status: COMPLETED | OUTPATIENT
Start: 2023-05-09 | End: 2023-05-09

## 2023-05-09 RX ORDER — NAPROXEN 500 MG/1
500 TABLET ORAL 2 TIMES DAILY WITH MEALS
Qty: 14 TABLET | Refills: 0 | Status: SHIPPED | OUTPATIENT
Start: 2023-05-09 | End: 2023-05-16

## 2023-05-09 RX ADMIN — KETOROLAC TROMETHAMINE 30 MG: 30 INJECTION, SOLUTION INTRAMUSCULAR; INTRAVENOUS at 04:05

## 2023-05-09 NOTE — LETTER
[facility]  [address], [address]  [place] [address]  Phone: [phone]  Fax: [phone]  [facility] [employer]: [phone]    Pt Name: Matthew Sam Date: 05/09/2023   Employee ID:  Date of First Treatment: 05/09/2023   Company: [employer]      Appointment Time: 03:05 PM Arrived: 03:30 pm   Provider: Sebastian So MD Time Out:04:45 pm     Office Treatment:   1. Contusion of forehead, initial encounter    2. Acute post-traumatic headache, not intractable    3. Facial contusion, initial encounter      Medications Ordered This Encounter   Medications    ketorolac injection 30 mg    naproxen (NAPROSYN) 500 MG tablet      Patient Instructions: Attention not to aggravate affected area (NAPROXEN RX FOR PAIN/INFLAMMATION)    Restrictions: Home today, Disabled until next office visit (OFF TODAY AND TOMORROW 5/10/2023. RETURN TO CLINIC 5/11/2023 FOR CLEARANCE TO RETURN TO WORK)     Return Appointment: 5/11/2023 at 09:00 am  MARGE
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
## 2023-05-10 ENCOUNTER — TELEPHONE (OUTPATIENT)
Dept: URGENT CARE | Facility: CLINIC | Age: 58
End: 2023-05-10
Payer: MEDICAID

## 2023-05-10 NOTE — TELEPHONE ENCOUNTER
Called Pt, left a VM to call the [facility] to joaquina. F/U today.    Pt called back, stating he has a personal issue and is unsure if he can return today for a follow up.

## 2023-05-11 ENCOUNTER — TELEPHONE (OUTPATIENT)
Dept: URGENT CARE | Facility: CLINIC | Age: 58
End: 2023-05-11
Payer: MEDICAID

## 2023-05-12 ENCOUNTER — TELEPHONE (OUTPATIENT)
Dept: URGENT CARE | Facility: CLINIC | Age: 58
End: 2023-05-12
Payer: MEDICAID

## 2023-05-12 NOTE — TELEPHONE ENCOUNTER
Called patient and no answer. Left a voice message for the patient to return the call in reference to his missed [facility] Appointment. ANISHAG

## 2023-06-16 ENCOUNTER — PATIENT MESSAGE (OUTPATIENT)
Dept: PODIATRY | Facility: CLINIC | Age: 58
End: 2023-06-16
Payer: MEDICAID

## 2024-03-04 ENCOUNTER — LAB VISIT (OUTPATIENT)
Dept: LAB | Facility: HOSPITAL | Age: 59
End: 2024-03-04
Attending: NURSE PRACTITIONER
Payer: MEDICAID

## 2024-03-04 DIAGNOSIS — Z00.00 ENCOUNTER FOR GENERAL ADULT MEDICAL EXAMINATION WITHOUT ABNORMAL FINDINGS: Primary | ICD-10-CM

## 2024-03-04 DIAGNOSIS — Z12.5 ENCOUNTER FOR SCREENING FOR MALIGNANT NEOPLASM OF PROSTATE: ICD-10-CM

## 2024-03-04 LAB
ALBUMIN SERPL BCP-MCNC: 4.6 G/DL (ref 3.5–5.2)
ALP SERPL-CCNC: 68 U/L (ref 38–126)
ALT SERPL W/O P-5'-P-CCNC: 26 U/L (ref 10–44)
ANION GAP SERPL CALC-SCNC: 9 MMOL/L (ref 8–16)
AST SERPL-CCNC: 23 U/L (ref 15–46)
BASOPHILS # BLD AUTO: 0.03 K/UL (ref 0–0.2)
BASOPHILS NFR BLD: 0.6 % (ref 0–1.9)
BILIRUB SERPL-MCNC: 0.6 MG/DL (ref 0.1–1)
BILIRUB UR QL STRIP: NEGATIVE
CALCIUM SERPL-MCNC: 9.5 MG/DL (ref 8.7–10.5)
CHLORIDE SERPL-SCNC: 102 MMOL/L (ref 95–110)
CHOLEST SERPL-MCNC: 237 MG/DL (ref 120–199)
CHOLEST/HDLC SERPL: 4.6 {RATIO} (ref 2–5)
CLARITY UR REFRACT.AUTO: CLEAR
CO2 SERPL-SCNC: 29 MMOL/L (ref 23–29)
COLOR UR AUTO: YELLOW
COMPLEXED PSA SERPL-MCNC: 1.9 NG/ML (ref 0–4)
CREAT SERPL-MCNC: 0.89 MG/DL (ref 0.5–1.4)
DIFFERENTIAL METHOD BLD: ABNORMAL
EOSINOPHIL # BLD AUTO: 0.1 K/UL (ref 0–0.5)
EOSINOPHIL NFR BLD: 2 % (ref 0–8)
ERYTHROCYTE [DISTWIDTH] IN BLOOD BY AUTOMATED COUNT: 12.4 % (ref 11.5–14.5)
EST. GFR  (NO RACE VARIABLE): >60 ML/MIN/1.73 M^2
GLUCOSE SERPL-MCNC: 115 MG/DL (ref 70–110)
GLUCOSE UR QL STRIP: NEGATIVE
HCT VFR BLD AUTO: 47.2 % (ref 40–54)
HDLC SERPL-MCNC: 52 MG/DL (ref 40–75)
HDLC SERPL: 21.9 % (ref 20–50)
HGB BLD-MCNC: 15.5 G/DL (ref 14–18)
HGB UR QL STRIP: ABNORMAL
IMM GRANULOCYTES # BLD AUTO: 0.03 K/UL (ref 0–0.04)
IMM GRANULOCYTES NFR BLD AUTO: 0.6 % (ref 0–0.5)
KETONES UR QL STRIP: NEGATIVE
LDLC SERPL CALC-MCNC: 162.6 MG/DL (ref 63–159)
LEUKOCYTE ESTERASE UR QL STRIP: NEGATIVE
LYMPHOCYTES # BLD AUTO: 1.8 K/UL (ref 1–4.8)
LYMPHOCYTES NFR BLD: 35.1 % (ref 18–48)
MCH RBC QN AUTO: 28.9 PG (ref 27–31)
MCHC RBC AUTO-ENTMCNC: 32.8 G/DL (ref 32–36)
MCV RBC AUTO: 88 FL (ref 82–98)
MONOCYTES # BLD AUTO: 0.4 K/UL (ref 0.3–1)
MONOCYTES NFR BLD: 8.4 % (ref 4–15)
NEUTROPHILS # BLD AUTO: 2.7 K/UL (ref 1.8–7.7)
NEUTROPHILS NFR BLD: 53.3 % (ref 38–73)
NITRITE UR QL STRIP: NEGATIVE
NONHDLC SERPL-MCNC: 185 MG/DL
NRBC BLD-RTO: 0 /100 WBC
PH UR STRIP: 6 [PH] (ref 5–8)
PLATELET # BLD AUTO: 326 K/UL (ref 150–450)
PMV BLD AUTO: 9.6 FL (ref 9.2–12.9)
POTASSIUM SERPL-SCNC: 4.3 MMOL/L (ref 3.5–5.1)
PROT SERPL-MCNC: 8.2 G/DL (ref 6–8.4)
PROT UR QL STRIP: NEGATIVE
RBC # BLD AUTO: 5.37 M/UL (ref 4.6–6.2)
SODIUM SERPL-SCNC: 140 MMOL/L (ref 136–145)
SP GR UR STRIP: 1.01 (ref 1–1.03)
T4 FREE SERPL-MCNC: 0.85 NG/DL (ref 0.71–1.51)
TRIGL SERPL-MCNC: 112 MG/DL (ref 30–150)
TSH SERPL DL<=0.005 MIU/L-ACNC: 4.1 UIU/ML (ref 0.4–4)
URN SPEC COLLECT METH UR: ABNORMAL
UROBILINOGEN UR STRIP-ACNC: NEGATIVE EU/DL
UUN UR-MCNC: 13 MG/DL (ref 2–20)
WBC # BLD AUTO: 5.1 K/UL (ref 3.9–12.7)

## 2024-03-04 PROCEDURE — 85025 COMPLETE CBC W/AUTO DIFF WBC: CPT | Mod: PN | Performed by: NURSE PRACTITIONER

## 2024-03-04 PROCEDURE — 80053 COMPREHEN METABOLIC PANEL: CPT | Mod: PN | Performed by: NURSE PRACTITIONER

## 2024-03-04 PROCEDURE — 84153 ASSAY OF PSA TOTAL: CPT | Performed by: NURSE PRACTITIONER

## 2024-03-04 PROCEDURE — 80061 LIPID PANEL: CPT | Performed by: NURSE PRACTITIONER

## 2024-03-04 PROCEDURE — 81003 URINALYSIS AUTO W/O SCOPE: CPT | Mod: PN | Performed by: NURSE PRACTITIONER

## 2024-03-04 PROCEDURE — 84443 ASSAY THYROID STIM HORMONE: CPT | Mod: PN | Performed by: NURSE PRACTITIONER

## 2024-03-04 PROCEDURE — 84439 ASSAY OF FREE THYROXINE: CPT | Performed by: NURSE PRACTITIONER

## 2024-03-04 PROCEDURE — 36415 COLL VENOUS BLD VENIPUNCTURE: CPT | Mod: PN | Performed by: NURSE PRACTITIONER

## 2024-05-11 ENCOUNTER — HOSPITAL ENCOUNTER (EMERGENCY)
Facility: HOSPITAL | Age: 59
Discharge: HOME | End: 2024-05-11
Attending: EMERGENCY MEDICINE
Payer: COMMERCIAL

## 2024-05-11 VITALS
TEMPERATURE: 98 F | SYSTOLIC BLOOD PRESSURE: 187 MMHG | HEIGHT: 65 IN | OXYGEN SATURATION: 100 % | BODY MASS INDEX: 37.49 KG/M2 | HEART RATE: 67 BPM | DIASTOLIC BLOOD PRESSURE: 86 MMHG | RESPIRATION RATE: 19 BRPM | WEIGHT: 225 LBS

## 2024-05-11 DIAGNOSIS — N28.1 RENAL CYST, RIGHT: ICD-10-CM

## 2024-05-11 DIAGNOSIS — R10.33 PERIUMBILICAL ABDOMINAL PAIN: Primary | ICD-10-CM

## 2024-05-11 DIAGNOSIS — R19.7 DIARRHEA, UNSPECIFIED TYPE: ICD-10-CM

## 2024-05-11 DIAGNOSIS — R31.9 HEMATURIA, UNSPECIFIED TYPE: ICD-10-CM

## 2024-05-11 LAB
ALBUMIN SERPL BCP-MCNC: 4.3 G/DL (ref 3.5–5.2)
ALP SERPL-CCNC: 67 U/L (ref 38–126)
ALT SERPL W/O P-5'-P-CCNC: 25 U/L (ref 10–44)
ANION GAP SERPL CALC-SCNC: 10 MMOL/L (ref 8–16)
AST SERPL-CCNC: 25 U/L (ref 15–46)
BACTERIA #/AREA URNS AUTO: ABNORMAL /HPF
BASOPHILS # BLD AUTO: 0.03 K/UL (ref 0–0.2)
BASOPHILS NFR BLD: 0.4 % (ref 0–1.9)
BILIRUB SERPL-MCNC: 0.8 MG/DL (ref 0.1–1)
BILIRUB UR QL STRIP: ABNORMAL
CALCIUM SERPL-MCNC: 9.4 MG/DL (ref 8.7–10.5)
CHLORIDE SERPL-SCNC: 105 MMOL/L (ref 95–110)
CLARITY UR REFRACT.AUTO: CLEAR
CO2 SERPL-SCNC: 27 MMOL/L (ref 23–29)
COLOR UR AUTO: YELLOW
CREAT SERPL-MCNC: 1.34 MG/DL (ref 0.5–1.4)
DIFFERENTIAL METHOD BLD: ABNORMAL
EOSINOPHIL # BLD AUTO: 0.1 K/UL (ref 0–0.5)
EOSINOPHIL NFR BLD: 1.9 % (ref 0–8)
ERYTHROCYTE [DISTWIDTH] IN BLOOD BY AUTOMATED COUNT: 12.7 % (ref 11.5–14.5)
EST. GFR  (NO RACE VARIABLE): >60 ML/MIN/1.73 M^2
GLUCOSE SERPL-MCNC: 117 MG/DL (ref 70–110)
GLUCOSE UR QL STRIP: NEGATIVE
HCT VFR BLD AUTO: 44.7 % (ref 40–54)
HGB BLD-MCNC: 15 G/DL (ref 14–18)
HGB UR QL STRIP: ABNORMAL
IMM GRANULOCYTES # BLD AUTO: 0.02 K/UL (ref 0–0.04)
IMM GRANULOCYTES NFR BLD AUTO: 0.3 % (ref 0–0.5)
KETONES UR QL STRIP: NEGATIVE
LEUKOCYTE ESTERASE UR QL STRIP: NEGATIVE
LIPASE SERPL-CCNC: 113 U/L (ref 23–300)
LYMPHOCYTES # BLD AUTO: 1.3 K/UL (ref 1–4.8)
LYMPHOCYTES NFR BLD: 17.3 % (ref 18–48)
MCH RBC QN AUTO: 29 PG (ref 27–31)
MCHC RBC AUTO-ENTMCNC: 33.6 G/DL (ref 32–36)
MCV RBC AUTO: 86 FL (ref 82–98)
MICROSCOPIC COMMENT: ABNORMAL
MONOCYTES # BLD AUTO: 0.6 K/UL (ref 0.3–1)
MONOCYTES NFR BLD: 8.8 % (ref 4–15)
NEUTROPHILS # BLD AUTO: 5.2 K/UL (ref 1.8–7.7)
NEUTROPHILS NFR BLD: 71.3 % (ref 38–73)
NITRITE UR QL STRIP: NEGATIVE
NRBC BLD-RTO: 0 /100 WBC
PH UR STRIP: 6 [PH] (ref 5–8)
PLATELET # BLD AUTO: 287 K/UL (ref 150–450)
PMV BLD AUTO: 9.6 FL (ref 9.2–12.9)
POTASSIUM SERPL-SCNC: 3.4 MMOL/L (ref 3.5–5.1)
PROT SERPL-MCNC: 7.2 G/DL (ref 6–8.4)
PROT UR QL STRIP: ABNORMAL
RBC # BLD AUTO: 5.18 M/UL (ref 4.6–6.2)
RBC #/AREA URNS AUTO: >100 /HPF (ref 0–4)
SODIUM SERPL-SCNC: 142 MMOL/L (ref 136–145)
SP GR UR STRIP: >=1.03 (ref 1–1.03)
URN SPEC COLLECT METH UR: ABNORMAL
UROBILINOGEN UR STRIP-ACNC: 1 EU/DL
UUN UR-MCNC: 24 MG/DL (ref 2–20)
WBC # BLD AUTO: 7.28 K/UL (ref 3.9–12.7)
WBC #/AREA URNS AUTO: 5 /HPF (ref 0–5)

## 2024-05-11 PROCEDURE — 99285 EMERGENCY DEPT VISIT HI MDM: CPT | Mod: 25,ER

## 2024-05-11 PROCEDURE — 85025 COMPLETE CBC W/AUTO DIFF WBC: CPT | Mod: ER | Performed by: EMERGENCY MEDICINE

## 2024-05-11 PROCEDURE — 81000 URINALYSIS NONAUTO W/SCOPE: CPT | Mod: ER | Performed by: EMERGENCY MEDICINE

## 2024-05-11 PROCEDURE — 96375 TX/PRO/DX INJ NEW DRUG ADDON: CPT | Mod: ER

## 2024-05-11 PROCEDURE — 96374 THER/PROPH/DIAG INJ IV PUSH: CPT | Mod: ER

## 2024-05-11 PROCEDURE — 25500020 PHARM REV CODE 255: Mod: ER | Performed by: EMERGENCY MEDICINE

## 2024-05-11 PROCEDURE — 80053 COMPREHEN METABOLIC PANEL: CPT | Mod: ER | Performed by: EMERGENCY MEDICINE

## 2024-05-11 PROCEDURE — 63600175 PHARM REV CODE 636 W HCPCS: Mod: ER | Performed by: EMERGENCY MEDICINE

## 2024-05-11 PROCEDURE — 83690 ASSAY OF LIPASE: CPT | Mod: ER | Performed by: EMERGENCY MEDICINE

## 2024-05-11 RX ORDER — KETOROLAC TROMETHAMINE 30 MG/ML
15 INJECTION, SOLUTION INTRAMUSCULAR; INTRAVENOUS
Status: COMPLETED | OUTPATIENT
Start: 2024-05-11 | End: 2024-05-11

## 2024-05-11 RX ORDER — ONDANSETRON HYDROCHLORIDE 2 MG/ML
4 INJECTION, SOLUTION INTRAVENOUS
Status: COMPLETED | OUTPATIENT
Start: 2024-05-11 | End: 2024-05-11

## 2024-05-11 RX ADMIN — ONDANSETRON 4 MG: 2 INJECTION INTRAMUSCULAR; INTRAVENOUS at 05:05

## 2024-05-11 RX ADMIN — KETOROLAC TROMETHAMINE 15 MG: 30 INJECTION, SOLUTION INTRAMUSCULAR at 05:05

## 2024-05-11 RX ADMIN — IOHEXOL 100 ML: 350 INJECTION, SOLUTION INTRAVENOUS at 06:05

## 2024-05-11 NOTE — PROVIDER PROGRESS NOTES - EMERGENCY DEPT.
Encounter Date: 5/11/2024    ED Physician Progress Notes        Physician Note:    Assumed care of this patient from Dr. Clark at shift change.   58-year-old male presenting for acute onset periumbilical abdominal pain after eating reheated Chinese food.  Labs obtained prior to my evaluation show hematuria.  No leukocytosis her metabolic abnormalities.   CT abdomen and pelvis shows no acute findings.  There is a right renal cyst.  There is right  nonobstructing nephrolithiasis.  Patient says that he has an appointment with Urology for further evaluation of hematuria that was found in the past.  Bladder scan shows no evidence of urinary retention.  No evidence of life-threatening emergency at this time.  I think patient can be discharged home with symptomatic treatment, encourage hydration with Urology or primary care follow up.

## 2024-05-11 NOTE — ED PROVIDER NOTES
"History       Chief Complaint   Patient presents with    Abdominal Pain     PT reports abdominal pain to the middle of abdomen since 7:30 pm yesterday evening after eating chinese food. Denies emesis or diarrhea. No fever reported. Describes pain as a continuous turning pain. Tums chewables taken for acid reflux. Rhonda Mesa taken 1 hr PTA without relief for abdominal pain.        HPI    Matthew Romero 58 y.o. presents to the emergency department today with a complaint of abdominal pain. The patient's pain is in umbilical region.  Patient reports it started about 8:00 a.m..  It occurred after he ate Re heated Chinese food including fried rice.  Does report that his wife ate the same food in his not feeling any symptoms at all.  He has pain in the umbilical region which is sharp.  He has had some diarrhea which is watery in nature.  Had some nausea but no vomiting.  No fever, chills or sweats.       ROS    Constitutional: No fever, no chills.  Eyes: No discharge. No pain.  HENT: No nasal drainage. No ear ache. No sore throat.  Cardiovascular: No chest pain, no palpitations.  Respiratory: No cough, no shortness of breath.  Gastrointestinal:  No melena.  No anorexia.  Genitourinary: No hematuria, dysuria, urgency.  Musculoskeletal: No back pain.   Skin: No rashes, no lesions.  Neurological: No headache, no focal weakness.    Otherwise remaining ROS negative     The history is provided by the patient      ALLERGIES REVIEWED  MEDICATIONS REVIEWED  PMH/PSH/SOC/FH REVIEWED      No past medical history on file.    No past surgical history on file.    Social History     Socioeconomic History    Marital status:    Tobacco Use    Smoking status: Never    Smokeless tobacco: Never       No family history on file.      Physical Exam    Nursing/Ancillary staff note reviewed.  VS reviewed  BP (!) 166/81 (BP Location: Left arm, Patient Position: Sitting)   Pulse 66   Temp 98.4 °F (36.9 °C) (Oral)   Resp 17   Ht 5' 5" " (1.651 m)   Wt 102.1 kg (225 lb)   SpO2 100%   BMI 37.44 kg/m²     General Appearance: The patient is alert, has no immediate need for airway protection and no signs of toxicity. No acute distress. Lying in bed but able to sit up without difficulty.   HEENT: Eyes: Pupils equal and round no pallor or injection. Extra ocular movements intact. No drainage.       Mouth: Mucous membranes are moist. Oropharynx clear.   Neck:Neck is supple non-tender. No lymphadenopathy. No stridor.   Respiratory: There are no retractions, lungs are clear to auscultation. No wheezing, no crackles. Chest wall nontender to palpation.   Cardiovascular: Regular rate and rhythm. No murmurs, rubs or gallops.  Gastrointestinal:  Abdomen is soft and has  tenderness to palpation around the umbilical region no guarding, no rebound.   no masses, bowel sounds normal. No pulsatile mass. No CVA tenderness.  Neurological: Alert and oriented x 4. CN II-XII grossly intact. No focal weakness. Strength intact 5/5 bilaterally in upper and lower extremities.   Skin: Warm and dry, no rashes.  Musculoskeletal:Musculoskeletal: Extremities are non-tender, non-swollen and have full range of motion. Back NTTP along the midline.           ED Course         Medical Decision Making  Problems Addressed:  Diarrhea, unspecified type: complicated acute illness or injury with systemic symptoms  Periumbilical abdominal pain: complicated acute illness or injury with systemic symptoms    Amount and/or Complexity of Data Reviewed  Labs: ordered.  Radiology: ordered.    Risk  Prescription drug management.          Medications   ketorolac injection 15 mg (15 mg Intravenous Given 5/11/24 0521)   ondansetron injection 4 mg (4 mg Intravenous Given 5/11/24 0521)         ED Course as of 05/11/24 0600   Sat May 11, 2024   0600 The patient care will be turned over to Dr. Quiñonez at shift change awaiting results of CT scan.  Patient's pain has improved. [JA]      ED Course User  Index  [JA] Adria Clark MD       DIFFERENTIAL DIAGNOSIS: After history and physical exam a differential diagnosis was considered, but was not limited to, food poisoning, food sensitivity, ileus, obstruction, cholelithiasis, cholecystitis, pancreatitis, gastritis, gastroenteritis, diverticulitis      Orders I order to further evaluate:    Orders Placed This Encounter   Procedures    CT Abdomen Pelvis With IV Contrast Routine Oral Contrast    CBC auto differential    Comprehensive metabolic panel    Lipase    Urinalysis, Reflex to Urine Culture Urine, Clean Catch    Urinalysis Microscopic    Insert Saline lock IV         ED Management:  Matthew Romero  presents to the emergency Department today with an acute, complicated problem with systemic symptoms of with c/o periumbilical abdominal pain.        This medical record was prepared using voice dictation software. There may be phonetic errors.        Impression          The primary encounter diagnosis was Periumbilical abdominal pain. A diagnosis of Diarrhea, unspecified type was also pertinent to this visit.               Adria Clark MD  05/11/24 0600

## 2024-07-09 ENCOUNTER — HOSPITAL ENCOUNTER (EMERGENCY)
Facility: HOSPITAL | Age: 59
Discharge: HOME | End: 2024-07-09
Attending: EMERGENCY MEDICINE
Payer: COMMERCIAL

## 2024-07-09 VITALS
TEMPERATURE: 98 F | HEIGHT: 65 IN | DIASTOLIC BLOOD PRESSURE: 68 MMHG | HEART RATE: 98 BPM | WEIGHT: 215 LBS | BODY MASS INDEX: 35.82 KG/M2 | OXYGEN SATURATION: 98 % | RESPIRATION RATE: 18 BRPM | SYSTOLIC BLOOD PRESSURE: 114 MMHG

## 2024-07-09 DIAGNOSIS — M54.2 NECK PAIN: Primary | ICD-10-CM

## 2024-07-09 PROCEDURE — 63600175 PHARM REV CODE 636 W HCPCS: Mod: ER | Performed by: PHYSICIAN ASSISTANT

## 2024-07-09 PROCEDURE — 99285 EMERGENCY DEPT VISIT HI MDM: CPT | Mod: 25,ER

## 2024-07-09 PROCEDURE — 96372 THER/PROPH/DIAG INJ SC/IM: CPT | Performed by: PHYSICIAN ASSISTANT

## 2024-07-09 RX ORDER — GABAPENTIN 300 MG/1
300 CAPSULE ORAL 3 TIMES DAILY
COMMUNITY
Start: 2024-06-14

## 2024-07-09 RX ORDER — TAMSULOSIN HYDROCHLORIDE 0.4 MG/1
CAPSULE ORAL
COMMUNITY
Start: 2024-05-29

## 2024-07-09 RX ORDER — LIDOCAINE 50 MG/G
1 PATCH TOPICAL DAILY
Qty: 5 PATCH | Refills: 0 | Status: SHIPPED | OUTPATIENT
Start: 2024-07-09

## 2024-07-09 RX ORDER — ATORVASTATIN CALCIUM 20 MG/1
20 TABLET, FILM COATED ORAL
COMMUNITY
Start: 2024-07-08

## 2024-07-09 RX ORDER — METHOCARBAMOL 500 MG/1
500 TABLET, FILM COATED ORAL 3 TIMES DAILY
Qty: 15 TABLET | Refills: 0 | Status: SHIPPED | OUTPATIENT
Start: 2024-07-09 | End: 2024-07-14

## 2024-07-09 RX ORDER — KETOROLAC TROMETHAMINE 30 MG/ML
15 INJECTION, SOLUTION INTRAMUSCULAR; INTRAVENOUS
Status: COMPLETED | OUTPATIENT
Start: 2024-07-09 | End: 2024-07-09

## 2024-07-09 RX ORDER — NAPROXEN 500 MG/1
500 TABLET ORAL 2 TIMES DAILY WITH MEALS
Qty: 30 TABLET | Refills: 0 | Status: SHIPPED | OUTPATIENT
Start: 2024-07-09

## 2024-07-09 RX ADMIN — KETOROLAC TROMETHAMINE 15 MG: 30 INJECTION, SOLUTION INTRAMUSCULAR at 11:07

## 2024-07-09 NOTE — ED PROVIDER NOTES
Encounter Date: 7/9/2024       History     Chief Complaint   Patient presents with    Neck Pain     NEck pain that shoots down to back that started yesterday. Denies injury. PT reports is just came out of nowhere     58-year-old male presents to ED with concern of posterior neck pain that began yesterday.  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  Pain is sharp, worse with movement, mildly improved with home Tylenol, severity 9/10.  Denying headache, visual changes, numbness or focal weakness, lightheadedness dizziness, chest pain, cough, shortness of breath.  No previous neck surgeries.  No other acute complaints at this time.    The history is provided by the patient.     Review of patient's allergies indicates:  No Known Allergies  Past Medical History:   Diagnosis Date    BPH (benign prostatic hyperplasia)     High cholesterol     Hypertension      History reviewed. No pertinent surgical history.  No family history on file.  Social History     Tobacco Use    Smoking status: Never    Smokeless tobacco: Never     Review of Systems   Constitutional:  Negative for chills and fever.   Respiratory:  Negative for cough and shortness of breath.    Cardiovascular:  Negative for chest pain.   Musculoskeletal:  Positive for neck pain.   Neurological:  Negative for weakness, numbness and headaches.       Physical Exam     Initial Vitals [07/09/24 0944]   BP Pulse Resp Temp SpO2   (!) 104/55 105 20 98.2 °F (36.8 °C) 96 %      MAP       --         Physical Exam    Vitals reviewed.  Constitutional: He appears well-developed and well-nourished. He is active. He does not have a sickly appearance. He does not appear ill. No distress.   HENT:   Head: Normocephalic and atraumatic.   Neck:   Generalized cervical paraspinal along with mild midline tenderness near C5-C7 region.  No bony palpable step-offs.  No skin changes or warmth.  Pain  worsened with lateral movement but patient does have full range motion.  No nuchal rigidity.  Appropriate sensation and strength into bilateral upper extremities   Normal range of motion.  Pulmonary/Chest: Effort normal and breath sounds normal.   Abdominal: Abdomen is soft. There is no abdominal tenderness.   Musculoskeletal:      Cervical back: Normal range of motion.     Neurological: He is alert. GCS eye subscore is 4. GCS verbal subscore is 5. GCS motor subscore is 6.   Skin: Skin is warm and dry.   Psychiatric: He has a normal mood and affect. His speech is normal and behavior is normal.         ED Course   Procedures  Labs Reviewed - No data to display       Imaging Results              CT Cervical Spine Without Contrast (Final result)  Result time 07/09/24 11:17:49      Final result by Gabriele Mcdonald MD (07/09/24 11:17:49)                   Impression:      1.  Negative CT scan of the cervical spine. No evidence of fracture or subluxation.    2.  Multilevel nerve root foramen narrowing from uncovertebral osteophyte formation and facet hypertrophy.    3.  Nonemergent findings as described above.    All CT scans at this facility are performed  using dose modulation techniques as appropriate to performed exam including the following:  automated exposure control; adjustment of mA and/or kV according to the patients size (this includes techniques or standardized protocols for targeted exams where dose is matched to indication/reason for exam: i.e. extremities or head);  iterative reconstruction technique.      Electronically signed by: Gabriele Mcdonald MD  Date:    07/09/2024  Time:    11:17               Narrative:    EXAMINATION:  CT CERVICAL SPINE WITHOUT CONTRAST    CLINICAL HISTORY:  Neck trauma, midline tenderness (Age 16-64y);    TECHNIQUE:  Axial noncontrast CT scan of the cervical spine with sagittal and coronal reconstructions.    COMPARISON:  No comparison studies are available.    FINDINGS:  The  cervical vertebrae reveal normal alignment, shape and bone density. The cervical vertebra are intact without evidence of fracture or dislocation.  Moderate degenerative changes between the anterior arch of C1 and the dens.  Mild to moderate multilevel marginal spondylosis.    The thecal sac is patent.  Multilevel nerve root foramen narrowing from uncovertebral osteophyte formation and facet hypertrophy.    Bilateral carotid artery calcifications.  Bruit?  The surrounding neck soft tissues are otherwise normal.  The lung apices are clear.    The left thyroid gland is atrophic or absent.  The right thyroid gland is normal.    The visualized portions of the brain and posterior fossa is normal.                                       Medications   ketorolac injection 15 mg (15 mg Intramuscular Given 7/9/24 1126)     Medical Decision Making  Patient presents with concern of neck pain that began yesterday while at work.  Afebrile.  Patient in no distress on exam.  Reproducible cervical tenderness noted with no neurological deficits    DDx:  Including but not limited to strain, sprain, spasm, radiculopathy, neuropathy, DDD, arthritis    Amount and/or Complexity of Data Reviewed  Radiology: ordered. Decision-making details documented in ED Course.    Risk  Prescription drug management.               ED Course as of 07/09/24 1211   Tue Jul 09, 2024   1208 CT Cervical Spine Without Contrast  CT cervical spine per Radiology review:  1.  Negative CT scan of the cervical spine. No evidence of fracture or subluxation.     2.  Multilevel nerve root foramen narrowing from uncovertebral osteophyte formation and facet hypertrophy.      [KS]   1208 Results were discussed with patient.  With careful consideration, I do have high suspicion his presenting neck pain is musculoskeletal and will continue with supportive care.  Given IM Toradol in ED.  Prescriptions as written below.  Encouraged ice/heat, stretches and movements as tolerated  with outpatient follow-up.  ED return precautions were discussed.  Patient states his understanding and agrees with plan. [KS]      ED Course User Index  [KS] Omar Stephens PA-C                           Clinical Impression:  Final diagnoses:  [M54.2] Neck pain (Primary)          ED Disposition Condition    Discharge Stable          ED Prescriptions       Medication Sig Dispense Start Date End Date Auth. Provider    naproxen (NAPROSYN) 500 MG tablet Take 1 tablet (500 mg total) by mouth 2 (two) times daily with meals. 30 tablet 7/9/2024 -- Omar Stephens PA-C    methocarbamoL (ROBAXIN) 500 MG Tab Take 1 tablet (500 mg total) by mouth 3 (three) times daily. for 5 days 15 tablet 7/9/2024 7/14/2024 Omar Stephens PA-C    LIDOcaine (LIDODERM) 5 % Place 1 patch onto the skin once daily. Remove & Discard patch within 12 hours or as directed by MD 5 patch 7/9/2024 -- Omar Stephens PA-C          Follow-up Information       Follow up With Specialties Details Why Contact Info    Your Doctor                 Omar Stephens PA-C  07/09/24 1210       Omar Stephens PA-C  07/09/24 1211

## 2024-07-09 NOTE — DISCHARGE INSTRUCTIONS

## 2024-07-09 NOTE — ED TRIAGE NOTES
Posterior neck pain since yesterday with no h/o injury. 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 States pain is worse when turning head from side-to-side but does not radiate. Pain eased during the night after taking Tylenol but has gradually returned today. Presents in no distress.

## 2024-07-09 NOTE — Clinical Note
"Matthew Iglesias" Nick was seen and treated in our emergency department on 7/9/2024.  He may return to work on 07/10/2024.       If you have any questions or concerns, please don't hesitate to call.      Omar Stephens PA-C"

## 2024-07-15 ENCOUNTER — LAB VISIT (OUTPATIENT)
Dept: LAB | Facility: HOSPITAL | Age: 59
End: 2024-07-15
Attending: PHYSICIAN ASSISTANT
Payer: COMMERCIAL

## 2024-07-15 DIAGNOSIS — E78.00 PURE HYPERCHOLESTEROLEMIA, UNSPECIFIED: Primary | ICD-10-CM

## 2024-07-15 LAB
ALBUMIN SERPL BCP-MCNC: 4.4 G/DL (ref 3.5–5.2)
ALP SERPL-CCNC: 73 U/L (ref 38–126)
ALT SERPL W/O P-5'-P-CCNC: 43 U/L (ref 10–44)
ANION GAP SERPL CALC-SCNC: 14 MMOL/L (ref 8–16)
AST SERPL-CCNC: 28 U/L (ref 15–46)
BILIRUB SERPL-MCNC: 0.5 MG/DL (ref 0.1–1)
CALCIUM SERPL-MCNC: 9.4 MG/DL (ref 8.7–10.5)
CHLORIDE SERPL-SCNC: 101 MMOL/L (ref 95–110)
CHOLEST SERPL-MCNC: 170 MG/DL (ref 120–199)
CHOLEST/HDLC SERPL: 3.5 {RATIO} (ref 2–5)
CO2 SERPL-SCNC: 26 MMOL/L (ref 23–29)
CREAT SERPL-MCNC: 0.98 MG/DL (ref 0.5–1.4)
EST. GFR  (NO RACE VARIABLE): >60 ML/MIN/1.73 M^2
GLUCOSE SERPL-MCNC: 110 MG/DL (ref 70–110)
HDLC SERPL-MCNC: 49 MG/DL (ref 40–75)
HDLC SERPL: 28.8 % (ref 20–50)
LDLC SERPL CALC-MCNC: 103 MG/DL (ref 63–159)
NONHDLC SERPL-MCNC: 121 MG/DL
POTASSIUM SERPL-SCNC: 4.3 MMOL/L (ref 3.5–5.1)
PROT SERPL-MCNC: 7.5 G/DL (ref 6–8.4)
SODIUM SERPL-SCNC: 141 MMOL/L (ref 136–145)
TRIGL SERPL-MCNC: 90 MG/DL (ref 30–150)
UUN UR-MCNC: 20 MG/DL (ref 2–20)

## 2024-07-15 PROCEDURE — 80053 COMPREHEN METABOLIC PANEL: CPT | Mod: PN | Performed by: PHYSICIAN ASSISTANT

## 2024-07-15 PROCEDURE — 36415 COLL VENOUS BLD VENIPUNCTURE: CPT | Mod: PN | Performed by: PHYSICIAN ASSISTANT

## 2024-07-15 PROCEDURE — 80061 LIPID PANEL: CPT | Performed by: PHYSICIAN ASSISTANT

## 2025-02-20 ENCOUNTER — HOSPITAL ENCOUNTER (OUTPATIENT)
Dept: RADIOLOGY | Facility: HOSPITAL | Age: 60
Discharge: HOME | End: 2025-02-20
Attending: NURSE PRACTITIONER
Payer: MEDICAID

## 2025-02-20 DIAGNOSIS — M54.50 LOW BACK PAIN: ICD-10-CM

## 2025-02-20 DIAGNOSIS — R31.1 BENIGN MICROSCOPIC HEMATURIA: ICD-10-CM

## 2025-02-20 PROCEDURE — 76770 US EXAM ABDO BACK WALL COMP: CPT | Mod: TC,PN

## 2025-02-20 PROCEDURE — 72100 X-RAY EXAM L-S SPINE 2/3 VWS: CPT | Mod: TC,FY,PN

## 2025-04-22 ENCOUNTER — HOSPITAL ENCOUNTER (EMERGENCY)
Facility: HOSPITAL | Age: 60
Discharge: HOME | End: 2025-04-22
Attending: EMERGENCY MEDICINE

## 2025-04-22 VITALS
RESPIRATION RATE: 18 BRPM | SYSTOLIC BLOOD PRESSURE: 121 MMHG | HEIGHT: 65 IN | DIASTOLIC BLOOD PRESSURE: 83 MMHG | TEMPERATURE: 99 F | BODY MASS INDEX: 37.15 KG/M2 | WEIGHT: 223 LBS | HEART RATE: 88 BPM | OXYGEN SATURATION: 99 %

## 2025-04-22 DIAGNOSIS — T30.0 SECOND DEGREE BURNS OF MULTIPLE SITES: Primary | ICD-10-CM

## 2025-04-22 PROCEDURE — 63600175 PHARM REV CODE 636 W HCPCS: Mod: ER | Performed by: EMERGENCY MEDICINE

## 2025-04-22 PROCEDURE — 99284 EMERGENCY DEPT VISIT MOD MDM: CPT | Mod: 25,ER

## 2025-04-22 PROCEDURE — 90471 IMMUNIZATION ADMIN: CPT | Mod: ER | Performed by: EMERGENCY MEDICINE

## 2025-04-22 PROCEDURE — 25000003 PHARM REV CODE 250: Mod: ER | Performed by: EMERGENCY MEDICINE

## 2025-04-22 PROCEDURE — 90715 TDAP VACCINE 7 YRS/> IM: CPT | Mod: ER | Performed by: EMERGENCY MEDICINE

## 2025-04-22 RX ORDER — BACITRACIN ZINC 500 UNIT/G
OINTMENT (GRAM) TOPICAL 2 TIMES DAILY
Qty: 30 G | Refills: 0 | Status: SHIPPED | OUTPATIENT
Start: 2025-04-22

## 2025-04-22 RX ADMIN — CLOSTRIDIUM TETANI TOXOID ANTIGEN (FORMALDEHYDE INACTIVATED), CORYNEBACTERIUM DIPHTHERIAE TOXOID ANTIGEN (FORMALDEHYDE INACTIVATED), BORDETELLA PERTUSSIS TOXOID ANTIGEN (GLUTARALDEHYDE INACTIVATED), BORDETELLA PERTUSSIS FILAMENTOUS HEMAGGLUTININ ANTIGEN (FORMALDEHYDE INACTIVATED), BORDETELLA PERTUSSIS PERTACTIN ANTIGEN, AND BORDETELLA PERTUSSIS FIMBRIAE 2/3 ANTIGEN 0.5 ML: 5; 2; 2.5; 5; 3; 5 INJECTION, SUSPENSION INTRAMUSCULAR at 10:04

## 2025-04-22 RX ADMIN — BACITRACIN ZINC, NEOMYCIN, POLYMYXIN B: 400; 3.5; 5 OINTMENT TOPICAL at 10:04

## 2025-04-22 NOTE — DISCHARGE INSTRUCTIONS
[email or web address]    [facility]  [address] [place] [address]  [phone]    Please contact the burn center at the number indicated for a close follow-up appointment.  Alternatively can follow up with the primary care doctor or with wound care.  I have placed an ambulatory referral for you to go to wound care.    Please change your dressings twice daily    Please return to the emergency department with any other symptoms or concerns          Thank you for choosing [facility]! We appreciate you trusting us with your medical care.     It is important to remember that some problems are difficult to diagnose and may not be found during your first visit. Be sure to follow up with your primary care doctor and review any labs/imaging that was performed during your visit with them. If you do not have a primary care doctor, you may contact the one listed on your discharge paperwork, or you may also call the [facility] Appointment Desk at [phone] to schedule an appointment.     All medications may potentially have side effects and it is impossible to predict which medications may give you side effects. If you feel that you are having a negative effect of any medication you should immediately stop taking them and seek medical attention. Do not drive or make any important decisions for 24 hours if you have received any pain medications, sedatives or mood altering drugs during your ER visit.    We will be happy to take care of you for all of your future medical needs. You may return to the ER at any time for any new/concerning symptoms, worsening condition, or failure to improve. We hope you feel better soon.     Ty Quiñonez MD MPH  Emergency Medicine
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
## 2025-04-24 ENCOUNTER — TELEPHONE (OUTPATIENT)
Dept: WOUND CARE | Facility: HOSPITAL | Age: 60
End: 2025-04-24
Payer: COMMERCIAL

## 2025-04-24 NOTE — TELEPHONE ENCOUNTER
Left voice message requesting return call to #[phone] option 1 to assist with scheduling a wound care appointment.

## 2025-04-30 ENCOUNTER — HOSPITAL ENCOUNTER (OUTPATIENT)
Dept: WOUND CARE | Facility: HOSPITAL | Age: 60
Discharge: HOME | End: 2025-04-30
Attending: SURGERY

## 2025-04-30 VITALS
WEIGHT: 223.13 LBS | DIASTOLIC BLOOD PRESSURE: 101 MMHG | SYSTOLIC BLOOD PRESSURE: 122 MMHG | HEIGHT: 65 IN | HEART RATE: 90 BPM | BODY MASS INDEX: 37.18 KG/M2 | TEMPERATURE: 98 F

## 2025-04-30 DIAGNOSIS — T22.20XA SECOND DEGREE BURN OF ARM, INITIAL ENCOUNTER: Primary | ICD-10-CM

## 2025-04-30 DIAGNOSIS — T24.202A SECOND DEGREE BURN OF LEFT LEG, INITIAL ENCOUNTER: ICD-10-CM

## 2025-04-30 PROCEDURE — 99213 OFFICE O/P EST LOW 20 MIN: CPT

## 2025-04-30 NOTE — PROGRESS NOTES
Wound care supply order electronically submitted to [facility] via [employer]
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
## 2025-07-11 ENCOUNTER — HOSPITAL ENCOUNTER (EMERGENCY)
Facility: HOSPITAL | Age: 60
Discharge: HOME | End: 2025-07-11
Attending: FAMILY MEDICINE
Payer: MEDICAID

## 2025-07-11 VITALS
TEMPERATURE: 98 F | HEIGHT: 65 IN | DIASTOLIC BLOOD PRESSURE: 78 MMHG | HEART RATE: 113 BPM | BODY MASS INDEX: 37.49 KG/M2 | RESPIRATION RATE: 20 BRPM | OXYGEN SATURATION: 98 % | SYSTOLIC BLOOD PRESSURE: 158 MMHG | WEIGHT: 225 LBS

## 2025-07-11 DIAGNOSIS — R07.9 CHEST PAIN: ICD-10-CM

## 2025-07-11 DIAGNOSIS — R42 LIGHTHEADED: Primary | ICD-10-CM

## 2025-07-11 DIAGNOSIS — R51.9 NONINTRACTABLE HEADACHE, UNSPECIFIED CHRONICITY PATTERN, UNSPECIFIED HEADACHE TYPE: ICD-10-CM

## 2025-07-11 LAB
ABSOLUTE EOSINOPHIL (OHS): 0.1 K/UL
ABSOLUTE MONOCYTE (OHS): 0.7 K/UL (ref 0.3–1)
ABSOLUTE NEUTROPHIL COUNT (OHS): 2.73 K/UL (ref 1.8–7.7)
ALBUMIN SERPL BCP-MCNC: 4.1 G/DL (ref 3.5–5.2)
ALP SERPL-CCNC: 70 UNIT/L (ref 38–126)
ALT SERPL W/O P-5'-P-CCNC: 38 UNIT/L (ref 10–44)
ANION GAP (OHS): 9 MMOL/L (ref 8–16)
AST SERPL-CCNC: 23 UNIT/L (ref 15–46)
BASOPHILS # BLD AUTO: 0.04 K/UL
BASOPHILS NFR BLD AUTO: 0.7 %
BILIRUB SERPL-MCNC: 0.7 MG/DL (ref 0.1–1)
BUN SERPL-MCNC: 11 MG/DL (ref 2–20)
CALCIUM SERPL-MCNC: 9 MG/DL (ref 8.7–10.5)
CHLORIDE SERPL-SCNC: 106 MMOL/L (ref 95–110)
CO2 SERPL-SCNC: 26 MMOL/L (ref 23–29)
CREAT SERPL-MCNC: 0.8 MG/DL (ref 0.5–1.4)
ERYTHROCYTE [DISTWIDTH] IN BLOOD BY AUTOMATED COUNT: 12.7 % (ref 11.5–14.5)
GFR SERPLBLD CREATININE-BSD FMLA CKD-EPI: >60 ML/MIN/1.73/M2
GLUCOSE SERPL-MCNC: 134 MG/DL (ref 70–110)
HCT VFR BLD AUTO: 42.1 % (ref 40–54)
HGB BLD-MCNC: 13.9 GM/DL (ref 14–18)
IMM GRANULOCYTES # BLD AUTO: 0.01 K/UL (ref 0–0.04)
IMM GRANULOCYTES NFR BLD AUTO: 0.2 % (ref 0–0.5)
LYMPHOCYTES # BLD AUTO: 1.78 K/UL (ref 1–4.8)
MCH RBC QN AUTO: 28.4 PG (ref 27–31)
MCHC RBC AUTO-ENTMCNC: 33 G/DL (ref 32–36)
MCV RBC AUTO: 86 FL (ref 82–98)
NUCLEATED RBC (/100WBC) (OHS): 0 /100 WBC
OHS QRS DURATION: 72 MS
OHS QTC CALCULATION: 442 MS
PLATELET # BLD AUTO: 305 K/UL (ref 150–450)
PMV BLD AUTO: 9.4 FL (ref 9.2–12.9)
POTASSIUM SERPL-SCNC: 3.7 MMOL/L (ref 3.5–5.1)
PROT SERPL-MCNC: 7.1 GM/DL (ref 6–8.4)
RBC # BLD AUTO: 4.89 M/UL (ref 4.6–6.2)
RELATIVE EOSINOPHIL (OHS): 1.9 %
RELATIVE LYMPHOCYTE (OHS): 33.2 % (ref 18–48)
RELATIVE MONOCYTE (OHS): 13.1 % (ref 4–15)
RELATIVE NEUTROPHIL (OHS): 50.9 % (ref 38–73)
SODIUM SERPL-SCNC: 141 MMOL/L (ref 136–145)
TROPONIN I SERPL DL<=0.01 NG/ML-MCNC: <0.012 NG/ML (ref 0–0.03)
WBC # BLD AUTO: 5.36 K/UL (ref 3.9–12.7)

## 2025-07-11 PROCEDURE — 93005 ELECTROCARDIOGRAM TRACING: CPT | Mod: ER

## 2025-07-11 PROCEDURE — 82040 ASSAY OF SERUM ALBUMIN: CPT | Mod: ER | Performed by: PHYSICIAN ASSISTANT

## 2025-07-11 PROCEDURE — 85025 COMPLETE CBC W/AUTO DIFF WBC: CPT | Mod: ER | Performed by: PHYSICIAN ASSISTANT

## 2025-07-11 PROCEDURE — 84484 ASSAY OF TROPONIN QUANT: CPT | Mod: ER

## 2025-07-11 PROCEDURE — 96374 THER/PROPH/DIAG INJ IV PUSH: CPT | Mod: ER

## 2025-07-11 PROCEDURE — 25000003 PHARM REV CODE 250: Mod: ER

## 2025-07-11 PROCEDURE — 96375 TX/PRO/DX INJ NEW DRUG ADDON: CPT | Mod: ER

## 2025-07-11 PROCEDURE — 63600175 PHARM REV CODE 636 W HCPCS: Mod: JZ,TB,ER

## 2025-07-11 PROCEDURE — 99285 EMERGENCY DEPT VISIT HI MDM: CPT | Mod: 25,ER

## 2025-07-11 RX ORDER — MECLIZINE HYDROCHLORIDE 25 MG/1
25 TABLET ORAL 3 TIMES DAILY PRN
Qty: 20 TABLET | Refills: 0 | Status: SHIPPED | OUTPATIENT
Start: 2025-07-11

## 2025-07-11 RX ORDER — KETOROLAC TROMETHAMINE 30 MG/ML
15 INJECTION, SOLUTION INTRAMUSCULAR; INTRAVENOUS
Status: COMPLETED | OUTPATIENT
Start: 2025-07-11 | End: 2025-07-11

## 2025-07-11 RX ORDER — NAPROXEN 500 MG/1
500 TABLET ORAL 2 TIMES DAILY WITH MEALS
Qty: 60 TABLET | Refills: 0 | Status: SHIPPED | OUTPATIENT
Start: 2025-07-11

## 2025-07-11 RX ORDER — MECLIZINE HCL 12.5 MG 12.5 MG/1
25 TABLET ORAL
Status: COMPLETED | OUTPATIENT
Start: 2025-07-11 | End: 2025-07-11

## 2025-07-11 RX ORDER — ACETAMINOPHEN 500 MG
1000 TABLET ORAL 4 TIMES DAILY
Qty: 112 TABLET | Refills: 0 | Status: SHIPPED | OUTPATIENT
Start: 2025-07-11 | End: 2025-07-25

## 2025-07-11 RX ORDER — SUMATRIPTAN SUCCINATE 50 MG/1
50 TABLET ORAL
COMMUNITY

## 2025-07-11 RX ORDER — METOCLOPRAMIDE HYDROCHLORIDE 5 MG/ML
10 INJECTION INTRAMUSCULAR; INTRAVENOUS
Status: COMPLETED | OUTPATIENT
Start: 2025-07-11 | End: 2025-07-11

## 2025-07-11 RX ADMIN — MECLIZINE 25 MG: 12.5 TABLET ORAL at 03:07

## 2025-07-11 RX ADMIN — METOCLOPRAMIDE 10 MG: 5 INJECTION, SOLUTION INTRAMUSCULAR; INTRAVENOUS at 03:07

## 2025-07-11 RX ADMIN — KETOROLAC TROMETHAMINE 15 MG: 30 INJECTION, SOLUTION INTRAMUSCULAR; INTRAVENOUS at 03:07
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
## 2025-07-11 NOTE — Clinical Note
"Matthew Iglesias" Nick was seen and treated in our emergency department on 7/11/2025.  He may return to work on 07/15/2025.       If you have any questions or concerns, please don't hesitate to call.      Tati Limon PA-C"

## 2025-07-11 NOTE — DISCHARGE INSTRUCTIONS
Please return to the Emergency Department for any new or worsening symptoms including: worsening abdominal pain, dark\black\bloody bowel movements, vomiting blood, hard abdomen, fever, chest pain, shortness of breath, loss of consciousness or any other concerns.     It is important to remember that some problems are difficult to diagnose and may not be found during your first visit. Be sure to follow up with your primary care doctor and review any labs/imaging that was performed during your visit with them. If you do not have a primary care doctor, you may contact the one listed on your discharge paperwork, or you may also call the [facility] Appointment Desk at [phone] to schedule an appointment.     All medications may potentially have side effects and it is impossible to predict which medications may give you side effects. If you feel that you are having a negative effect of any medication you should immediately stop taking them and seek medical attention. Do not drive or make any important decisions for 24 hours if you have received any pain medications, sedatives or mood altering drugs during your ER visit.    We will be happy to take care of you for all of your future medical needs. You may return to the ER at any time for any new/concerning symptoms, worsening condition, or failure to improve. We hope you feel better soon.     Thank you for allowing me to take care of you today.       Tati Limon PA-C

## 2025-07-11 NOTE — ED PROVIDER NOTES
"Encounter Date: 7/11/2025       History     Chief Complaint   Patient presents with    Headache     Pt reports headaches X 3 weeks. Pt reports pain behind eyes, dizziness, difficulty concentrating, and "twitching." Reports slipping in the tub a few days ago. Pt took Imitrex at 0700.      Matthew Romero is a 59 y.o. male  has a past medical history of BPH (benign prostatic hyperplasia), High cholesterol, and Hypertension. presenting to the Emergency Department for multiple complaints.  Patient's 1st complaint is intermittent lightheadedness for years.  Patient states symptoms worsened when active and moving around.  Patient reports feeling of feeling unsteady.  Patient's 2nd complaint is headaches 3 weeks.  Patient reports these headaches feel like the same headaches he had following an accident and skull surgery years ago.  Patient states his headache did resolve following the skull surgery.  Patient states these headaches have the same quality of symptoms including pain behind the right eye.  Patient has Imitrex which he took at 7 o'clock this morning.  Denies any photophobia, phonophobia, nausea, vomiting, abdominal pain, diarrhea constipation, urinary symptoms.  Patient also reports an episode of chest pain a few days ago.  Patient reports a history of having a cardiac stress.  Patient does not have chest pain at this time.  No other complaints at this time.        The history is provided by the patient.     Review of patient's allergies indicates:  No Known Allergies  Past Medical History:   Diagnosis Date    BPH (benign prostatic hyperplasia)     High cholesterol     Hypertension      History reviewed. No pertinent surgical history.  No family history on file.  Social History[1]  Review of Systems   Constitutional:  Negative for fever.   HENT:  Negative for sore throat.    Respiratory:  Negative for shortness of breath.    Cardiovascular:  Positive for chest pain.   Gastrointestinal:  Negative for abdominal " pain, constipation, diarrhea, nausea and vomiting.   Genitourinary:  Negative for dysuria.   Musculoskeletal:  Negative for back pain.   Skin:  Negative for rash.   Neurological:  Positive for light-headedness and headaches. Negative for weakness.   Hematological:  Does not bruise/bleed easily.   All other systems reviewed and are negative.      Physical Exam     Initial Vitals [07/11/25 1345]   BP Pulse Resp Temp SpO2   121/76 102 18 98.5 °F (36.9 °C) 98 %      MAP       --         Physical Exam    Nursing note and vitals reviewed.  Constitutional: Vital signs are normal. He appears well-developed and well-nourished. He is not diaphoretic. He is active.  Non-toxic appearance. No distress.   Ambulates with steady gait   HENT:   Head: Normocephalic and atraumatic.   Right Ear: Hearing, tympanic membrane, external ear and ear canal normal.   Left Ear: Hearing, tympanic membrane, external ear and ear canal normal.   Nose: Nose normal. Right sinus exhibits no maxillary sinus tenderness and no frontal sinus tenderness. Left sinus exhibits no maxillary sinus tenderness and no frontal sinus tenderness. Mouth/Throat: Uvula is midline, oropharynx is clear and moist and mucous membranes are normal. No trismus in the jaw. No uvula swelling. No oropharyngeal exudate, posterior oropharyngeal edema or posterior oropharyngeal erythema.   Eyes: Conjunctivae, EOM and lids are normal. Pupils are equal, round, and reactive to light. Right eye exhibits no nystagmus. Left eye exhibits no nystagmus.   Neck: Phonation normal. Neck supple. No Brudzinski's sign and no Kernig's sign noted.   Normal range of motion.  Cardiovascular:  Normal rate, regular rhythm, normal heart sounds and normal pulses.           Pulmonary/Chest: No respiratory distress. He has no wheezes. He has no rhonchi. He has no rales.   Abdominal: Bowel sounds are normal. He exhibits no distension. There is no abdominal tenderness. There is no rebound.   Musculoskeletal:          General: Normal range of motion.      Cervical back: Normal range of motion and neck supple. No rigidity. Normal range of motion.     Lymphadenopathy:     He has no cervical adenopathy.   Neurological: He is alert and oriented to person, place, and time. He has normal strength. He is not disoriented. No cranial nerve deficit or sensory deficit. GCS score is 15. GCS eye subscore is 4. GCS verbal subscore is 5. GCS motor subscore is 6.   Skin: Skin is dry. Capillary refill takes less than 2 seconds.   Psychiatric: He has a normal mood and affect. His behavior is normal. Judgment and thought content normal.         ED Course   Procedures  Labs Reviewed   COMPREHENSIVE METABOLIC PANEL - Abnormal       Result Value    Sodium 141      Potassium 3.7      Chloride 106      CO2 26      Glucose 134 (*)     BUN 11      Creatinine 0.8      Calcium 9.0      Protein Total 7.1      Albumin 4.1      Bilirubin Total 0.7      ALP 70      AST 23      ALT 38      Anion Gap 9      eGFR >60     CBC WITH DIFFERENTIAL - Abnormal    WBC 5.36      RBC 4.89      HGB 13.9 (*)     HCT 42.1      MCV 86      MCH 28.4      MCHC 33.0      RDW 12.7      Platelet Count 305      MPV 9.4      Nucleated RBC 0      Neut % 50.9      Lymph % 33.2      Mono % 13.1      Eos % 1.9      Basophil % 0.7      Imm Grans % 0.2      Neut # 2.73      Lymph # 1.78      Mono # 0.70      Eos # 0.10      Baso # 0.04      Imm Grans # 0.01     TROPONIN I - Normal    Troponin-I <0.012     CBC W/ AUTO DIFFERENTIAL    Narrative:     The following orders were created for panel order CBC auto differential.  Procedure                               Abnormality         Status                     ---------                               -----------         ------                     CBC with Differential[[id number]]       Abnormal            Final result                 Please view results for these tests on the individual orders.        ECG Results              EKG 12-lead  (In process)        Collection Time Result Time QRS Duration [facility] QTC Calculation    07/11/25 14:06:24 07/11/25 14:15:08 72 442                     In process by Interface, Lab In [facility] (07/11/25 14:15:14)                   Narrative:    Test Reason : R42,    Vent. Rate :  88 BPM     Atrial Rate :  88 BPM     P-R Int : 142 ms          QRS Dur :  72 ms      QT Int : 366 ms       P-R-T Axes :  70  35  25 degrees    QTcB Int : 442 ms    Normal sinus rhythm  Normal ECG  No previous ECGs available    Referred By:            Confirmed By:                                   Imaging Results              CT Head Without Contrast (Final result)  Result time 07/11/25 14:55:35      Final result by Chi Webber MD (07/11/25 14:55:35)                   Impression:      No acute intracranial abnormality.  See findings and recommendations above.    All CT scans at this facility use dose modulation, iterative reconstruction, and/or weight based dosing when appropriate to reduce radiation dose to as low as reasonable achievable.      Electronically signed by: Chi Webber MD  Date:    07/11/2025  Time:    14:55               Narrative:    EXAMINATION:  CT HEAD WITHOUT CONTRAST    CLINICAL HISTORY:  Headache, new or worsening (Age >= 50y);    TECHNIQUE:  Low dose axial CT images obtained throughout the head without intravenous contrast. Sagittal and coronal reconstructions were performed.    All CT scans at this facility use dose modulation, iterative reconstruction, and/or weight based dosing when appropriate to reduce radiation dose to as low as reasonable achievable.    COMPARISON:  None.    FINDINGS:  Intracranial compartment:    The brain parenchyma appears normal. No parenchymal mass, hemorrhage, edema or major vascular distribution infarct.    Ventricles and sulci are normal in size for age without evidence of hydrocephalus.    No extra-axial blood or fluid collections.    Skull/extracranial contents (limited evaluation):  Lytic lesion involving the inner and outer tables of the high right parietal bone unclear clinical significance given lack of comparison.  Findings could reflect remote instrumentation.  Findings are best seen on sequence 2 images 26 through 28.  If there is history of malignancy, bone scan could be obtained on a nonemergent basis.  No fracture.  Large polyp or retention cyst right maxillary sinus.  No fluid levels.  Mastoid air cells and paranasal sinuses are essentially clear.                                       X-Ray Chest 1 View (Final result)  Result time 07/11/25 14:30:20      Final result by Gabriele Mcdonald MD (07/11/25 14:30:20)                   Impression:      1.  Negative for acute process involving the chest.    2.  Incidental findings as noted above.      Electronically signed by: Gabriele Mcdonald MD  Date:    07/11/2025  Time:    14:30               Narrative:    EXAMINATION:  XR CHEST 1 VIEW    CLINICAL HISTORY:  Chest pain, unspecified    COMPARISON:  No comparison studies are available.    FINDINGS:  EKG leads overlie the chest.  The lungs are clear. The cardiac silhouette size is normal. The trachea is midline and the mediastinal width is normal. Negative for focal infiltrate, effusion or pneumothorax. Pulmonary vasculature is normal. Negative for osseous abnormalities. Tortuous aorta.  Marginal spondylosis.                                       Medications   ketorolac injection 15 mg (15 mg Intravenous Given 7/11/25 1538)   metoclopramide injection 10 mg (10 mg Intravenous Given 7/11/25 1539)   meclizine tablet 25 mg (25 mg Oral Given 7/11/25 1538)     Medical Decision Making  This is an emergent evaluation of 59 y.o. male in the ED presenting for headache. Physical exam reveals a non-toxic, afebrile, and well-appearing male in no apparent respiratory distress. Pertinent physical exam findings above. Vital signs stable. If available, previous records reviewed.    Feel the patient's headache is  "benign.  The headache completely resolved with meclizine, Reglan,, and toradol.  No neck stiffness, vision changes, fever, rash, meningismus/neck stiffness to suggest pseudotumor cerebri or meningitis.  No pain over temporal arteries or vision changes/loss to suggest temporal arteritis.  No "thunderclap onset" or neck stiffness to suggest spontaneous SAH/ICH.  No lancinated pain to eyes with tearing to suggest cluster headache.  No sinus pressure or nasal congestion to suggest sinus headache.  Patient's headache is not in a "band like" distrubution to suggest tension headache.  CT head shows no signs of mass, pseudotumor, or ICH. The patient's current headache seems to fit the intensity and pattern of prior headaches.  Patient has no evidence of infection nor neurological findings to suggest a more malignant cause for the headache. Including but not limited to ACS/MI, PE, aortic dissection, pneumothorax, cardiac tamponade, pericarditis/myocarditis, pneumonia, infection/abscess, lung mass, trauma/fracture, costochondritis/pleurisy, MSK pain/contusion, GERD, biliary disease, pancreatitis, anemia. Including but not limited to peripheral vertigo, inner ear disease, vestibular neuritis, migraine headache, meniere disease, vestibular tumor, CVA, carotid disease or dissection, orthostatic hypotension, significant dehydration, electrolyte abnormality, anemia, arrhythmia, myocardial ischemia.      My overall impression is :  Lightheaded (Primary)  Chest pain  Nonintractable headache, unspecified chronicity pattern, unspecified headache type  . Differential Diagnoses: Included but not limited to Tension headache, pseudotumor cerebri, temporal arteritis, ICH/SAH, Intracranial mass, migraine, meningitis, cluster headache, sinus headache, viral syndrome    Discharge Meds/Instructions: Meds below. Outpatient f/u.  Referral to Neurology and Cardiology.        There does not appear to be any indication for further emergent testing, " observation, or hospitalization at this time. A mutual shared decision making discussion was had with the patient. Patient appears stable for and is comfortable with discharge home. The diagnosis, treatment plan, instructions for follow-up as well as ED return precautions were discussed. Advised to follow-up with PCP for outpatient follow-up in 2-3 days. Signs and symptoms that would warrant immediate return to ED were reviewed prior to discharge. All questions and concerns were asked, answered, and addressed. Patient expressed understanding and agreement with the plan.     This case was discussed with my attending, Dr. Olson who is in agreement with my assessment and plan.        Amount and/or Complexity of Data Reviewed  Labs: ordered. Decision-making details documented in ED Course.  Radiology: ordered and independent interpretation performed. Decision-making details documented in ED Course.  ECG/medicine tests: ordered and independent interpretation performed. Decision-making details documented in ED Course.    Risk  OTC drugs.  Prescription drug management.               ED Course as of 07/11/25 1652   Fri Jul 11, 2025   1410 EKG 12-lead  Independent interpretation by me: Normal sinus rhythm.  STEMI.  No ectopy.  88 beats per minute. [LH]   1437 Comprehensive metabolic panel(!) [LH]   1437 CBC auto differential(!) [LH]   1437 X-Ray Chest 1 View  Independent interpretation by me: no lung consolidation, effusion, or pneumothorax. Cardiac silhouette appears normal. I have also read the radiologist's report and agree with their findings.    [LH]   1455 Not orthostatic [LH]   1518 CT Head Without Contrast  I have read the radiologist's report and agree with their findings.  No acute abnormality.     [LH]   1626 On reassessment, patient reports improvement of his symptoms following medications. [LH]      ED Course User Index  [LH] Tati Limon PA-C                           Clinical Impression:  Final  diagnoses:  [R42] Lightheaded (Primary)  [R07.9] Chest pain  [R51.9] Nonintractable headache, unspecified chronicity pattern, unspecified headache type          ED Disposition Condition    Discharge Stable          ED Prescriptions       Medication Sig Dispense Start Date End Date Auth. Provider    meclizine (ANTIVERT) 25 mg tablet Take 1 tablet (25 mg total) by mouth 3 (three) times daily as needed. 20 tablet 7/11/2025 -- Tati Limon PA-C    naproxen (NAPROSYN) 500 MG tablet Take 1 tablet (500 mg total) by mouth 2 (two) times daily with meals. 60 tablet 7/11/2025 -- Tati Limon PA-C    acetaminophen (TYLENOL) 500 MG tablet Take 2 tablets (1,000 mg total) by mouth 4 (four) times daily. for 14 days 112 tablet 7/11/2025 7/25/2025 Tati Limon PA-C          Follow-up Information    None                [1]   Social History  Tobacco Use    Smoking status: Never    Smokeless tobacco: Never        Tati Limon PA-C  07/11/25 [id number]

## 2025-07-28 ENCOUNTER — HOSPITAL ENCOUNTER (EMERGENCY)
Facility: HOSPITAL | Age: 60
Discharge: HOME | End: 2025-07-28
Attending: EMERGENCY MEDICINE
Payer: MEDICAID

## 2025-07-28 VITALS
OXYGEN SATURATION: 98 % | TEMPERATURE: 99 F | SYSTOLIC BLOOD PRESSURE: 130 MMHG | WEIGHT: 220 LBS | DIASTOLIC BLOOD PRESSURE: 84 MMHG | RESPIRATION RATE: 15 BRPM | HEART RATE: 82 BPM | HEIGHT: 65 IN | BODY MASS INDEX: 36.65 KG/M2

## 2025-07-28 DIAGNOSIS — K29.70 GASTRITIS WITHOUT BLEEDING, UNSPECIFIED CHRONICITY, UNSPECIFIED GASTRITIS TYPE: Primary | ICD-10-CM

## 2025-07-28 DIAGNOSIS — R07.9 CHEST PAIN: ICD-10-CM

## 2025-07-28 LAB
ABSOLUTE EOSINOPHIL (OHS): 0.07 K/UL
ABSOLUTE MONOCYTE (OHS): 0.63 K/UL (ref 0.3–1)
ABSOLUTE NEUTROPHIL COUNT (OHS): 4.14 K/UL (ref 1.8–7.7)
ALBUMIN SERPL BCP-MCNC: 4.4 G/DL (ref 3.5–5.2)
ALP SERPL-CCNC: 84 UNIT/L (ref 40–150)
ALT SERPL W/O P-5'-P-CCNC: 34 UNIT/L (ref 10–44)
ANION GAP (OHS): 15 MMOL/L (ref 8–16)
AST SERPL-CCNC: 24 UNIT/L (ref 11–45)
BASOPHILS # BLD AUTO: 0.05 K/UL
BASOPHILS NFR BLD AUTO: 0.7 %
BILIRUB SERPL-MCNC: 0.7 MG/DL (ref 0.1–1)
BUN SERPL-MCNC: 12 MG/DL (ref 6–20)
CALCIUM SERPL-MCNC: 9.3 MG/DL (ref 8.7–10.5)
CHLORIDE SERPL-SCNC: 107 MMOL/L (ref 95–110)
CO2 SERPL-SCNC: 20 MMOL/L (ref 23–29)
CREAT SERPL-MCNC: 1 MG/DL (ref 0.5–1.4)
ERYTHROCYTE [DISTWIDTH] IN BLOOD BY AUTOMATED COUNT: 13 % (ref 11.5–14.5)
GFR SERPLBLD CREATININE-BSD FMLA CKD-EPI: >60 ML/MIN/1.73/M2
GLUCOSE SERPL-MCNC: 107 MG/DL (ref 70–110)
HCT VFR BLD AUTO: 46.4 % (ref 40–54)
HGB BLD-MCNC: 15.4 GM/DL (ref 14–18)
IMM GRANULOCYTES # BLD AUTO: 0.03 K/UL (ref 0–0.04)
IMM GRANULOCYTES NFR BLD AUTO: 0.4 % (ref 0–0.5)
LYMPHOCYTES # BLD AUTO: 1.92 K/UL (ref 1–4.8)
MCH RBC QN AUTO: 28.3 PG (ref 27–31)
MCHC RBC AUTO-ENTMCNC: 33.2 G/DL (ref 32–36)
MCV RBC AUTO: 85 FL (ref 82–98)
NT-PROBNP SERPL-MCNC: 39 PG/ML
NUCLEATED RBC (/100WBC) (OHS): 0 /100 WBC
PLATELET # BLD AUTO: 315 K/UL (ref 150–450)
PMV BLD AUTO: 10 FL (ref 9.2–12.9)
POTASSIUM SERPL-SCNC: 4.2 MMOL/L (ref 3.5–5.1)
PROT SERPL-MCNC: 8 GM/DL (ref 6–8.4)
RBC # BLD AUTO: 5.44 M/UL (ref 4.6–6.2)
RELATIVE EOSINOPHIL (OHS): 1 %
RELATIVE LYMPHOCYTE (OHS): 28.1 % (ref 18–48)
RELATIVE MONOCYTE (OHS): 9.2 % (ref 4–15)
RELATIVE NEUTROPHIL (OHS): 60.6 % (ref 38–73)
SODIUM SERPL-SCNC: 142 MMOL/L (ref 136–145)
TROPONIN I SERPL HS-MCNC: 4 NG/L
TROPONIN I SERPL HS-MCNC: 6 NG/L
WBC # BLD AUTO: 6.84 K/UL (ref 3.9–12.7)

## 2025-07-28 PROCEDURE — 83880 ASSAY OF NATRIURETIC PEPTIDE: CPT | Performed by: EMERGENCY MEDICINE

## 2025-07-28 PROCEDURE — 93005 ELECTROCARDIOGRAM TRACING: CPT

## 2025-07-28 PROCEDURE — 93010 ELECTROCARDIOGRAM REPORT: CPT | Mod: ,,, | Performed by: STUDENT IN AN ORGANIZED HEALTH CARE EDUCATION/TRAINING PROGRAM

## 2025-07-28 PROCEDURE — 80053 COMPREHEN METABOLIC PANEL: CPT | Performed by: EMERGENCY MEDICINE

## 2025-07-28 PROCEDURE — 25000003 PHARM REV CODE 250: Performed by: EMERGENCY MEDICINE

## 2025-07-28 PROCEDURE — 99285 EMERGENCY DEPT VISIT HI MDM: CPT | Mod: 25

## 2025-07-28 PROCEDURE — 85025 COMPLETE CBC W/AUTO DIFF WBC: CPT | Performed by: EMERGENCY MEDICINE

## 2025-07-28 PROCEDURE — 84484 ASSAY OF TROPONIN QUANT: CPT | Performed by: EMERGENCY MEDICINE

## 2025-07-28 RX ORDER — LIDOCAINE HYDROCHLORIDE 20 MG/ML
15 SOLUTION OROPHARYNGEAL
Status: COMPLETED | OUTPATIENT
Start: 2025-07-28 | End: 2025-07-28

## 2025-07-28 RX ORDER — ALUMINUM HYDROXIDE, MAGNESIUM HYDROXIDE, AND SIMETHICONE 1200; 120; 1200 MG/30ML; MG/30ML; MG/30ML
30 SUSPENSION ORAL
Status: COMPLETED | OUTPATIENT
Start: 2025-07-28 | End: 2025-07-28

## 2025-07-28 RX ORDER — ALUMINUM HYDROXIDE, MAGNESIUM HYDROXIDE, AND SIMETHICONE 2400; 240; 2400 MG/30ML; MG/30ML; MG/30ML
15 SUSPENSION ORAL EVERY 6 HOURS PRN
Qty: 335 ML | Refills: 0 | Status: SHIPPED | OUTPATIENT
Start: 2025-07-28 | End: 2025-08-27

## 2025-07-28 RX ORDER — FAMOTIDINE 20 MG/1
20 TABLET, FILM COATED ORAL 2 TIMES DAILY
Qty: 28 TABLET | Refills: 0 | Status: SHIPPED | OUTPATIENT
Start: 2025-07-28 | End: 2025-08-11

## 2025-07-28 RX ADMIN — ALUMINUM HYDROXIDE, MAGNESIUM HYDROXIDE, AND SIMETHICONE 30 ML: 200; 200; 20 SUSPENSION ORAL at 05:07

## 2025-07-28 RX ADMIN — LIDOCAINE HYDROCHLORIDE 15 ML: 20 SOLUTION ORAL at 05:07

## 2025-07-28 NOTE — ED PROVIDER NOTES
Encounter Date: 7/28/2025       History     Chief Complaint   Patient presents with    Chest Pain     Midsternal chest pain and shortness of breath that ~1 hour PTA while eating. Appears uncomfortable.     59-year-old male with past medical history including BPH, HLD, HTN presents for evaluation of chest pain.  Patient says that about an hour ago he was eating peanuts.  He said about 30 minutes ago, he had onset of chest pain that he describes as a sensation of something being stuck in his chest. He was concerned because this started while [place]. He says that he has had chest pain before but this is slightly different.  He does admit to a history of gastric reflux and but does not take any medication for it and says this feels somewhat similar.    The history is provided by the patient and the spouse.     Review of patient's allergies indicates:  No Known Allergies  Past Medical History:   Diagnosis Date    BPH (benign prostatic hyperplasia)     High cholesterol     Hypertension      History reviewed. No pertinent surgical history.  No family history on file.  Social History[1]  Review of Systems    Physical Exam     Initial Vitals [07/28/25 1647]   BP Pulse Resp Temp SpO2   (!) 145/91 (!) 124 (!) 24 98.9 °F (37.2 °C) 99 %      MAP       --         Physical Exam    Constitutional: He appears well-developed and well-nourished. No distress.   HENT:   Head: Normocephalic and atraumatic.   Eyes: EOM are normal.   Neck:   Normal range of motion.  Cardiovascular:  Regular rhythm.           Tachycardic    Pulmonary/Chest: No respiratory distress.   Abdominal: Abdomen is soft. He exhibits no distension. There is no abdominal tenderness.   Musculoskeletal:         General: Normal range of motion.      Cervical back: Normal range of motion.     Neurological: He is alert and oriented to person, place, and time.   Skin: Skin is warm and dry.   Psychiatric: He has a normal mood and affect.          ED Course   Procedures  Labs Reviewed   COMPREHENSIVE METABOLIC PANEL - Abnormal       Result Value    Sodium 142      Potassium 4.2      Chloride 107      CO2 20 (*)     Glucose 107      BUN 12      Creatinine 1.0      Calcium 9.3      Protein Total 8.0      Albumin 4.4      Bilirubin Total 0.7      ALP 84      AST 24      ALT 34      Anion Gap 15      eGFR >60     TROPONIN I HIGH SENSITIVITY - Normal    Troponin High Sensitive 4     TROPONIN I HIGH SENSITIVITY - Normal    Troponin High Sensitive 6     NT-PRO NATRIURETIC PEPTIDE - Normal    NT-proBNP 39      Narrative:     NOTE:  Access complete set of age - and/or gender-specific reference intervals for this test in the [facility] Collection Manual.   CBC WITH DIFFERENTIAL - Normal    WBC 6.84      RBC 5.44      HGB 15.4      HCT 46.4      MCV 85      MCH 28.3      MCHC 33.2      RDW 13.0      Platelet Count 315      MPV 10.0      Nucleated RBC 0      Neut % 60.6      Lymph % 28.1      Mono % 9.2      Eos % 1.0      Basophil % 0.7      Imm Grans % 0.4      Neut # 4.14      Lymph # 1.92      Mono # 0.63      Eos # 0.07      Baso # 0.05      Imm Grans # 0.03     CBC W/ AUTO DIFFERENTIAL    Narrative:     The following orders were created for panel order CBC auto differential.  Procedure                               Abnormality         Status                     ---------                               -----------         ------                     CBC with Differential[[id number]]       Normal              Final result                 Please view results for these tests on the individual orders.        ECG Results              EKG 12-lead (In process)        Collection Time Result Time QRS Duration [facility] QTC Calculation    07/28/25 16:44:11 07/28/25 16:54:45 66 431                     In process by Interface, Lab In [facility] (07/28/25 16:54:52)                   Narrative:    Test Reason : R07.9,    Vent. Rate : 126 BPM     Atrial Rate : 126 BPM     P-R  Int : 142 ms          QRS Dur :  66 ms      QT Int : 298 ms       P-R-T Axes :  65  37 -15 degrees    QTcB Int : 431 ms    Sinus tachycardia  Right atrial enlargement  T wave abnormality, consider inferior ischemia  Abnormal ECG  When compared with ECG of 11-Jul-2025 14:06,  T wave inversion now evident in Inferior leads    Referred By: AAAREFERRAL SELF           Confirmed By:                                   Imaging Results              X-Ray Chest AP Portable (Final result)  Result time 07/28/25 18:04:55      Final result by Aftab Diaz DO (07/28/25 18:04:55)                   Impression:      No acute abnormality.      Electronically signed by: Aftab Diaz  Date:    07/28/2025  Time:    18:04               Narrative:    EXAMINATION:  XR CHEST AP PORTABLE    CLINICAL HISTORY:  chest pain;    TECHNIQUE:  Single frontal view of the chest was performed.    COMPARISON:  07/11/2025.    FINDINGS:  The lungs are well expanded and clear. No focal opacities are seen. The pleural spaces are clear. The cardiac silhouette is unremarkable. The visualized osseous structures are unremarkable.                                       Medications   aluminum-magnesium hydroxide-simethicone 200-200-20 mg/5 mL suspension 30 mL (30 mLs Oral Given 7/28/25 1744)   LIDOcaine viscous HCl 2% oral solution 15 mL (15 mLs Oral Given 7/28/25 1744)     Medical Decision Making  60 yo M p/w complaint of chest pain. Presentation not c/w esophageal food impaction as he is tolerating water without difficulty during my assessment. No evidence of volume overload or shock on exam. EKG without acute ischemic changes. Low suspicion for acute PE, pneumothorax, thoracic aortic dissection, cardiac effusion / tamponade. CXR without acute disease including on my independent interpretation. Troponin wnl including on repeat. No electrolyte abnormalities. HEART score low risk. Pt discharged with strict return precautions and outpatient f/u. Pain free at  time of discharge after GI cocktail. Says he already has a cardiologist and PCP.     No acute emergent medical condition has been identified. The patient appears to be low risk for an emergent medical condition is appropriate for discharge with outpatient f/u as detailed in discharge instructions for reevaluation and possible continued outpatient workup and management. I have discussed the workup with the patient, who has verbalized understanding of the plan and need for outpatient follow-up.  This evaluation does not preclude the development of an emergent condition so in addition, I have advised the patient that they can return to the ED at any time with worsening or change of their symptoms, or with any other medical complaint.       Amount and/or Complexity of Data Reviewed  Independent Historian: spouse     Details: At bedside throughout ED stay   External Data Reviewed: notes.     Details: Seen 7/11/25 for lightheadedness  Labs: ordered. Decision-making details documented in ED Course.  Radiology: ordered and independent interpretation performed.  ECG/medicine tests: ordered and independent interpretation performed.    Risk  OTC drugs.  Prescription drug management.  Decision regarding hospitalization.               ED Course as of 07/28/25 2139 Mon Jul 28, 2025 1816 Hemoglobin: 15.4  Normal  [AT]   1817 CXR Impression:   No acute abnormality   [AT]   1825 On reassessment pt feels significantly improved, updated on results thus far. [AT]   1839 Troponin I High Sensitivity: 4  Normal  [AT]   1846 Creatinine: 1.0  Normal  [AT]   2054 Pt asymptomatic and resting comfortably, no acute distress. [AT]   2100 Troponin I High Sensitivity: 6  Normal  [AT]      ED Course User Index  [AT] Rosalinda Flores MD                               Clinical Impression:  Final diagnoses:  [R07.9] Chest pain  [K29.70] Gastritis without bleeding, unspecified chronicity, unspecified gastritis type (Primary)          ED  Disposition Condition    Discharge Stable          ED Prescriptions       Medication Sig Dispense Start Date End Date Auth. Provider    aluminum & magnesium hydroxide-simethicone (MAALOX MAXIMUM STRENGTH) 400-400-40 mg/5 mL suspension Take 15 mLs by mouth every 6 (six) hours as needed for Indigestion. 335 mL 7/28/2025 8/27/2025 Rosalinda Flores MD    famotidine (PEPCID) 20 MG tablet Take 1 tablet (20 mg total) by mouth 2 (two) times daily. for 14 days 28 tablet 7/28/2025 8/11/2025 Rosalinda Flores MD          Follow-up Information       Follow up With Specialties Details Why Contact Info    Your primary care physician and cardiologist        [facility] Emergency Medicine  As needed, If symptoms worsen [address] [facility] [address] [phone]                   [1]   Social History  Tobacco Use    Smoking status: Never    Smokeless tobacco: Never        Rosalinda Flores MD  07/28/25 5503

## 2025-07-28 NOTE — ED NOTES
"Pt reports off and on tingling in BLE X years. Reports feels like it is getting worse at this time. Able to move FROM BLE, reports "tingling"  "

## 2025-07-29 LAB
OHS QRS DURATION: 66 MS
OHS QRS DURATION: 70 MS
OHS QTC CALCULATION: 431 MS
OHS QTC CALCULATION: 455 MS

## 2025-07-29 NOTE — DISCHARGE INSTRUCTIONS

## 2025-08-15 ENCOUNTER — HOSPITAL ENCOUNTER (OUTPATIENT)
Dept: RADIOLOGY | Facility: HOSPITAL | Age: 60
Discharge: HOME | End: 2025-08-15
Payer: MEDICAID

## 2025-08-15 DIAGNOSIS — M54.2 CERVICALGIA: ICD-10-CM

## 2025-08-15 PROCEDURE — 72127 CT NECK SPINE W/O & W/DYE: CPT | Mod: 26,,, | Performed by: RADIOLOGY

## 2025-08-15 PROCEDURE — 72127 CT NECK SPINE W/O & W/DYE: CPT | Mod: TC,PN

## 2025-08-15 PROCEDURE — 72040 X-RAY EXAM NECK SPINE 2-3 VW: CPT | Mod: TC,PN

## 2025-08-15 PROCEDURE — 72040 X-RAY EXAM NECK SPINE 2-3 VW: CPT | Mod: 26,,, | Performed by: RADIOLOGY

## 2025-08-15 PROCEDURE — 25500020 PHARM REV CODE 255: Mod: PN

## 2025-08-15 RX ADMIN — IOHEXOL 75 ML: 350 INJECTION, SOLUTION INTRAVENOUS at 12:08

## 2025-08-25 DIAGNOSIS — G43.909 MIGRAINE: Primary | ICD-10-CM
